# Patient Record
Sex: MALE | URBAN - METROPOLITAN AREA
[De-identification: names, ages, dates, MRNs, and addresses within clinical notes are randomized per-mention and may not be internally consistent; named-entity substitution may affect disease eponyms.]

---

## 2017-01-26 ENCOUNTER — EMERGENCY (EMERGENCY)
Facility: HOSPITAL | Age: 27
LOS: 0 days | Discharge: HOME | End: 2017-01-27
Admitting: SPECIALIST

## 2017-06-27 DIAGNOSIS — R10.13 EPIGASTRIC PAIN: ICD-10-CM

## 2017-06-27 DIAGNOSIS — K52.9 NONINFECTIVE GASTROENTERITIS AND COLITIS, UNSPECIFIED: ICD-10-CM

## 2022-06-16 ENCOUNTER — INPATIENT (INPATIENT)
Facility: HOSPITAL | Age: 32
LOS: 6 days | Discharge: HOME | End: 2022-06-23
Attending: INTERNAL MEDICINE | Admitting: INTERNAL MEDICINE
Payer: COMMERCIAL

## 2022-06-16 VITALS — SYSTOLIC BLOOD PRESSURE: 190 MMHG | DIASTOLIC BLOOD PRESSURE: 133 MMHG

## 2022-06-16 DIAGNOSIS — Z78.9 OTHER SPECIFIED HEALTH STATUS: Chronic | ICD-10-CM

## 2022-06-16 LAB
ALBUMIN SERPL ELPH-MCNC: 4 G/DL — SIGNIFICANT CHANGE UP (ref 3.5–5.2)
ALP SERPL-CCNC: 89 U/L — SIGNIFICANT CHANGE UP (ref 30–115)
ALT FLD-CCNC: 24 U/L — SIGNIFICANT CHANGE UP (ref 0–41)
ANION GAP SERPL CALC-SCNC: 14 MMOL/L — SIGNIFICANT CHANGE UP (ref 7–14)
AST SERPL-CCNC: 39 U/L — SIGNIFICANT CHANGE UP (ref 0–41)
BASE EXCESS BLDV CALC-SCNC: 8.4 MMOL/L — HIGH (ref -2–3)
BASOPHILS # BLD AUTO: 0.08 K/UL — SIGNIFICANT CHANGE UP (ref 0–0.2)
BASOPHILS NFR BLD AUTO: 0.7 % — SIGNIFICANT CHANGE UP (ref 0–1)
BILIRUB SERPL-MCNC: 1.7 MG/DL — HIGH (ref 0.2–1.2)
BUN SERPL-MCNC: 16 MG/DL — SIGNIFICANT CHANGE UP (ref 10–20)
CA-I SERPL-SCNC: 1.09 MMOL/L — LOW (ref 1.15–1.33)
CALCIUM SERPL-MCNC: 9.1 MG/DL — SIGNIFICANT CHANGE UP (ref 8.5–10.1)
CHLORIDE SERPL-SCNC: 99 MMOL/L — SIGNIFICANT CHANGE UP (ref 98–110)
CO2 SERPL-SCNC: 27 MMOL/L — SIGNIFICANT CHANGE UP (ref 17–32)
CREAT SERPL-MCNC: 1.1 MG/DL — SIGNIFICANT CHANGE UP (ref 0.7–1.5)
CRP SERPL-MCNC: 10.8 MG/L — HIGH
D DIMER BLD IA.RAPID-MCNC: 470 NG/ML DDU — HIGH (ref 0–230)
EGFR: 90 ML/MIN/1.73M2 — SIGNIFICANT CHANGE UP
EOSINOPHIL # BLD AUTO: 0.11 K/UL — SIGNIFICANT CHANGE UP (ref 0–0.7)
EOSINOPHIL NFR BLD AUTO: 1 % — SIGNIFICANT CHANGE UP (ref 0–8)
ERYTHROCYTE [SEDIMENTATION RATE] IN BLOOD: 9 MM/HR — SIGNIFICANT CHANGE UP (ref 0–10)
GAS PNL BLDV: 137 MMOL/L — SIGNIFICANT CHANGE UP (ref 136–145)
GAS PNL BLDV: SIGNIFICANT CHANGE UP
GAS PNL BLDV: SIGNIFICANT CHANGE UP
GLUCOSE SERPL-MCNC: 102 MG/DL — HIGH (ref 70–99)
HCO3 BLDV-SCNC: 34 MMOL/L — HIGH (ref 22–29)
HCT VFR BLD CALC: 48.8 % — SIGNIFICANT CHANGE UP (ref 42–52)
HCT VFR BLDA CALC: 48 % — SIGNIFICANT CHANGE UP (ref 39–51)
HGB BLD CALC-MCNC: 15.9 G/DL — SIGNIFICANT CHANGE UP (ref 12.6–17.4)
HGB BLD-MCNC: 15.6 G/DL — SIGNIFICANT CHANGE UP (ref 14–18)
IMM GRANULOCYTES NFR BLD AUTO: 0.4 % — HIGH (ref 0.1–0.3)
LACTATE BLDV-MCNC: 1.3 MMOL/L — SIGNIFICANT CHANGE UP (ref 0.5–2)
LYMPHOCYTES # BLD AUTO: 2.97 K/UL — SIGNIFICANT CHANGE UP (ref 1.2–3.4)
LYMPHOCYTES # BLD AUTO: 26.3 % — SIGNIFICANT CHANGE UP (ref 20.5–51.1)
MAGNESIUM SERPL-MCNC: 1.5 MG/DL — LOW (ref 1.8–2.4)
MCHC RBC-ENTMCNC: 26.2 PG — LOW (ref 27–31)
MCHC RBC-ENTMCNC: 32 G/DL — SIGNIFICANT CHANGE UP (ref 32–37)
MCV RBC AUTO: 82 FL — SIGNIFICANT CHANGE UP (ref 80–94)
MONOCYTES # BLD AUTO: 0.53 K/UL — SIGNIFICANT CHANGE UP (ref 0.1–0.6)
MONOCYTES NFR BLD AUTO: 4.7 % — SIGNIFICANT CHANGE UP (ref 1.7–9.3)
NEUTROPHILS # BLD AUTO: 7.56 K/UL — HIGH (ref 1.4–6.5)
NEUTROPHILS NFR BLD AUTO: 66.9 % — SIGNIFICANT CHANGE UP (ref 42.2–75.2)
NRBC # BLD: 0 /100 WBCS — SIGNIFICANT CHANGE UP (ref 0–0)
NT-PROBNP SERPL-SCNC: 1827 PG/ML — HIGH (ref 0–300)
PCO2 BLDV: 46 MMHG — SIGNIFICANT CHANGE UP (ref 42–55)
PH BLDV: 7.47 — HIGH (ref 7.32–7.43)
PLATELET # BLD AUTO: 300 K/UL — SIGNIFICANT CHANGE UP (ref 130–400)
PO2 BLDV: 39 MMHG — SIGNIFICANT CHANGE UP
POTASSIUM BLDV-SCNC: 2.9 MMOL/L — CRITICAL LOW (ref 3.5–5.1)
POTASSIUM SERPL-MCNC: 3.3 MMOL/L — LOW (ref 3.5–5)
POTASSIUM SERPL-SCNC: 3.3 MMOL/L — LOW (ref 3.5–5)
PROT SERPL-MCNC: 6.6 G/DL — SIGNIFICANT CHANGE UP (ref 6–8)
RAPID RVP RESULT: SIGNIFICANT CHANGE UP
RBC # BLD: 5.95 M/UL — SIGNIFICANT CHANGE UP (ref 4.7–6.1)
RBC # FLD: 18.7 % — HIGH (ref 11.5–14.5)
SAO2 % BLDV: 63.6 % — SIGNIFICANT CHANGE UP
SARS-COV-2 RNA SPEC QL NAA+PROBE: SIGNIFICANT CHANGE UP
SODIUM SERPL-SCNC: 140 MMOL/L — SIGNIFICANT CHANGE UP (ref 135–146)
TROPONIN T SERPL-MCNC: 0.08 NG/ML — CRITICAL HIGH
TROPONIN T SERPL-MCNC: 0.09 NG/ML — CRITICAL HIGH
TSH SERPL-MCNC: 6.55 UIU/ML — HIGH (ref 0.27–4.2)
WBC # BLD: 11.29 K/UL — HIGH (ref 4.8–10.8)
WBC # FLD AUTO: 11.29 K/UL — HIGH (ref 4.8–10.8)

## 2022-06-16 PROCEDURE — 74177 CT ABD & PELVIS W/CONTRAST: CPT | Mod: 26,MA

## 2022-06-16 PROCEDURE — 71045 X-RAY EXAM CHEST 1 VIEW: CPT | Mod: 26

## 2022-06-16 PROCEDURE — 99223 1ST HOSP IP/OBS HIGH 75: CPT

## 2022-06-16 PROCEDURE — 71275 CT ANGIOGRAPHY CHEST: CPT | Mod: 26,MA

## 2022-06-16 PROCEDURE — 93306 TTE W/DOPPLER COMPLETE: CPT | Mod: 26

## 2022-06-16 PROCEDURE — 93010 ELECTROCARDIOGRAM REPORT: CPT

## 2022-06-16 PROCEDURE — 99285 EMERGENCY DEPT VISIT HI MDM: CPT | Mod: 25

## 2022-06-16 PROCEDURE — 93308 TTE F-UP OR LMTD: CPT | Mod: 26

## 2022-06-16 RX ORDER — MAGNESIUM SULFATE 500 MG/ML
2 VIAL (ML) INJECTION ONCE
Refills: 0 | Status: COMPLETED | OUTPATIENT
Start: 2022-06-16 | End: 2022-06-16

## 2022-06-16 RX ORDER — LISINOPRIL 2.5 MG/1
10 TABLET ORAL DAILY
Refills: 0 | Status: DISCONTINUED | OUTPATIENT
Start: 2022-06-16 | End: 2022-06-17

## 2022-06-16 RX ORDER — LABETALOL HCL 100 MG
10 TABLET ORAL ONCE
Refills: 0 | Status: COMPLETED | OUTPATIENT
Start: 2022-06-16 | End: 2022-06-16

## 2022-06-16 RX ORDER — CHLORHEXIDINE GLUCONATE 213 G/1000ML
1 SOLUTION TOPICAL
Refills: 0 | Status: DISCONTINUED | OUTPATIENT
Start: 2022-06-16 | End: 2022-06-23

## 2022-06-16 RX ORDER — POTASSIUM CHLORIDE 20 MEQ
20 PACKET (EA) ORAL ONCE
Refills: 0 | Status: COMPLETED | OUTPATIENT
Start: 2022-06-16 | End: 2022-06-16

## 2022-06-16 RX ORDER — ENOXAPARIN SODIUM 100 MG/ML
40 INJECTION SUBCUTANEOUS EVERY 24 HOURS
Refills: 0 | Status: DISCONTINUED | OUTPATIENT
Start: 2022-06-16 | End: 2022-06-23

## 2022-06-16 RX ORDER — CARVEDILOL PHOSPHATE 80 MG/1
3.12 CAPSULE, EXTENDED RELEASE ORAL EVERY 12 HOURS
Refills: 0 | Status: DISCONTINUED | OUTPATIENT
Start: 2022-06-16 | End: 2022-06-20

## 2022-06-16 RX ORDER — FUROSEMIDE 40 MG
40 TABLET ORAL DAILY
Refills: 0 | Status: DISCONTINUED | OUTPATIENT
Start: 2022-06-16 | End: 2022-06-17

## 2022-06-16 RX ORDER — ONDANSETRON 8 MG/1
4 TABLET, FILM COATED ORAL ONCE
Refills: 0 | Status: COMPLETED | OUTPATIENT
Start: 2022-06-16 | End: 2022-06-16

## 2022-06-16 RX ORDER — POTASSIUM CHLORIDE 20 MEQ
40 PACKET (EA) ORAL ONCE
Refills: 0 | Status: COMPLETED | OUTPATIENT
Start: 2022-06-16 | End: 2022-06-16

## 2022-06-16 RX ORDER — FUROSEMIDE 40 MG
40 TABLET ORAL ONCE
Refills: 0 | Status: COMPLETED | OUTPATIENT
Start: 2022-06-16 | End: 2022-06-16

## 2022-06-16 RX ADMIN — LISINOPRIL 10 MILLIGRAM(S): 2.5 TABLET ORAL at 16:59

## 2022-06-16 RX ADMIN — CARVEDILOL PHOSPHATE 3.12 MILLIGRAM(S): 80 CAPSULE, EXTENDED RELEASE ORAL at 20:47

## 2022-06-16 RX ADMIN — Medication 40 MILLIGRAM(S): at 11:23

## 2022-06-16 RX ADMIN — Medication 50 MILLIEQUIVALENT(S): at 10:33

## 2022-06-16 RX ADMIN — Medication 2 GRAM(S): at 09:38

## 2022-06-16 RX ADMIN — Medication 25 GRAM(S): at 09:05

## 2022-06-16 RX ADMIN — Medication 20 MILLIEQUIVALENT(S): at 12:09

## 2022-06-16 RX ADMIN — Medication 1.25 MILLIGRAM(S): at 11:23

## 2022-06-16 RX ADMIN — Medication 10 MILLIGRAM(S): at 18:38

## 2022-06-16 RX ADMIN — ENOXAPARIN SODIUM 40 MILLIGRAM(S): 100 INJECTION SUBCUTANEOUS at 20:47

## 2022-06-16 RX ADMIN — ONDANSETRON 4 MILLIGRAM(S): 8 TABLET, FILM COATED ORAL at 08:13

## 2022-06-16 NOTE — ED ADULT TRIAGE NOTE - PAIN: PRESENCE, MLM
Reclast injection #3 scheduled for 01/04/21 at 9:00.  Lab scheduled for patient to schedule at .  Dx M81.0.  Please create orders.  3   complains of pain/discomfort

## 2022-06-16 NOTE — CONSULT NOTE ADULT - SUBJECTIVE AND OBJECTIVE BOX
Cardiologist: none    HPI:  32 year old man with no PMHx presenting with palpitations since 4AM. He has been having progressive SOB and GUTIERREZ over the past 2 weeks and epigastric and chest "fullness" with minimal eating. He also endorses a few episodes of NBNB vomiting and b/l SHAHEEN. He has been sleeping upright the past few days. Denied cp, HA, fever, chills, diarrhea, rash.    PAST MEDICAL & SURGICAL HISTORY      FAMILY HISTORY:  FAMILY HISTORY: strong history of heart disease    SOCIAL HISTORY:  []smoker  []Alcohol  []Drug    ALLERGIES:  No Known Allergies    MEDICATIONS:  MEDICATIONS  (STANDING):  potassium chloride   Powder 40 milliEquivalent(s) Oral Once  potassium chloride  20 mEq/100 mL IVPB 20 milliEquivalent(s) IV Intermittent once    MEDICATIONS  (PRN):    HOME MEDICATIONS:  Home Medications:    VITALS:   T(F): 98.4 (06-16 @ 06:31), Max: 98.4 (06-16 @ 06:31)  HR: 110 (06-16 @ 09:05) (110 - 115)  BP: 178/118 (06-16 @ 09:05) (178/118 - 198/141)  BP(mean): --  RR: 18 (06-16 @ 06:31) (18 - 18)  SpO2: 96% (06-16 @ 06:31) (96% - 96%)    REVIEW OF SYSTEMS:    Negative except as mentioned in HPI      PHYSICAL EXAM:  NEURO: patient is awake , alert and oriented  GEN: Not in acute distress  NECK: no thyroid enlargement  LUNGS: course bs  CARDIOVASCULAR: S1/S2 present, tachycardic  ABD: Soft, non-tender, non-distended, +BS  EXT: b/l SHAHEEN  SKIN: Intact    LABS:                        15.6   11.29 )-----------( 300      ( 16 Jun 2022 08:00 )             48.8     06-16    140  |  99  |  16  ----------------------------<  102<H>  3.3<L>   |  27  |  1.1    Ca    9.1      16 Jun 2022 08:00  Mg     1.5     06-16    TPro  6.6  /  Alb  4.0  /  TBili  1.7<H>  /  DBili  x   /  AST  39  /  ALT  24  /  AlkPhos  89  06-16      Troponin T, Serum: 0.09 ng/mL *HH* (06-16-22 @ 08:00)    CARDIAC MARKERS ( 16 Jun 2022 08:00 )  x     / 0.09 ng/mL / x     / x     / x        Serum Pro-Brain Natriuretic Peptide: 1827 pg/mL (06-16-22 @ 08:00)    RADIOLOGY:  -CXR:  < from: Xray Chest 1 View- PORTABLE-Urgent (06.16.22 @ 08:30) >  Impression:    Cardiomegaly with interstitial prominence which may be related to edema.      < end of copied text >    -TTE:    -CCTA:    -STRESS TEST:    -CATHETERIZATION:    ECG:  < from: 12 Lead ECG (06.16.22 @ 06:26) >  Ventricular Rate 114 BPM    Atrial Rate 114 BPM    P-R Interval 138 ms    QRS Duration 98 ms    Q-T Interval 352 ms    QTC Calculation(Bazett) 485 ms    P Axis 55 degrees    R Axis -65 degrees    T Axis 88 degrees    Diagnosis Line Sinus tachycardia  Left atrial enlargement  Left anterior fascicular block  Nonspecific T wave abnormality  Abnormal ECG    < end of copied text >    TELEMETRY EVENTS: sinus tachycardia

## 2022-06-16 NOTE — ED PROVIDER NOTE - PROGRESS NOTE DETAILS
DC: Cardiology consulted regarding troponin. will evaluate. DC: Cardiology consult appreciated.  Lasix given.  Enalapril IV given for the blood pressure.  Imaging reviewed no PE.  Patient aware that he will be admitted.  RVP sent as well as CRP and ESR.  Consulted cardio telemetry however there are no beds available recommend admission to med telemetry

## 2022-06-16 NOTE — ED PROVIDER NOTE - PHYSICAL EXAMINATION
CONSTITUTIONAL: Well-developed; obese; in no acute distress.   SKIN: warm, dry.  HEAD: Normocephalic; atraumatic.  EYES: PERRL, EOMI, no conjunctival erythema- +right conjunctival hemorrhage  ENT: No nasal discharge; airway clear.  NECK: Supple; non tender.  CARD: S1, S2 normal; no murmurs, gallops, or rubs. Regular rate and rhythm.   RESP: No wheezes, rales or rhonchi.  ABD: soft ntnd.   EXT: Normal ROM.  LE edema bilaterally.   NEURO: Alert, oriented, grossly unremarkable.  PSYCH: Cooperative, appropriate.

## 2022-06-16 NOTE — H&P ADULT - HISTORY OF PRESENT ILLNESS
32M w/ no PMHx presenting with palpitations since 4AM. He has been having progressive SOB and GUTIERREZ over the past 2 weeks and epigastric and chest "fullness" with minimal eating. He also endorses a few episodes of NBNB vomiting and b/l SHAHEEN. He has been sleeping upright the past few days. Denied cp, HA, fever, chills, diarrhea, rash. 32M w/ no PMHx presents after he woke up from sleep w/ palpitations and SOB. Pt reports about a week ago he vomitted and since then he has been having episodes of dry cough, myalgia, and "Constricted" like feeling around chest. He also noticed that past few nights he has been sleeping with head elevation because it felt comfortable. No prior episodes of the same. Currently only c/o fatigue. Denies fever, chills, CP, SOB, palpitations, cough, wheezing, abd pain, nausea, vomiting, diarrhea, constipation, dysuria, or abnormal bleeding.  No Hx of COVID-19 infection. Received total 3 moderna shots.

## 2022-06-16 NOTE — H&P ADULT - NSHPLABSRESULTS_GEN_ALL_CORE
15.6   11.29 )-----------( 300      ( 16 Jun 2022 08:00 )             48.8       06-16    140  |  99  |  16  ----------------------------<  102<H>  3.3<L>   |  27  |  1.1    Ca    9.1      16 Jun 2022 08:00  Mg     1.5     06-16    TPro  6.6  /  Alb  4.0  /  TBili  1.7<H>  /  DBili  x   /  AST  39  /  ALT  24  /  AlkPhos  89  06-16        CARDIAC MARKERS ( 16 Jun 2022 08:00 )  x     / 0.09 ng/mL / x     / x     / x      < from: Xray Chest 1 View- PORTABLE-Urgent (06.16.22 @ 08:30) >  Impression:  Cardiomegaly with interstitial prominence which may be related to edema.  --- End of Report ---  < end of copied text >    < from: CT Angio Chest PE Protocol w/ IV Cont (06.16.22 @ 10:01) >  IMPRESSION:  No pulmonary embolus.  Small right, trace left pleural effusions with adjacent compressive   atelectasis.  Cardiomegaly.  No acute abnormality in the abdomen or pelvis.  --- End of Report ---  < end of copied text >      < from: CT Abdomen and Pelvis w/ IV Cont (06.16.22 @ 10:01) >  IMPRESSION:  No pulmonary embolus.  Small right, trace left pleural effusions with adjacent compressive   atelectasis. Cardiomegaly. No acute abnormality in the abdomen or pelvis.  --- End of Report ---  < end of copied text >

## 2022-06-16 NOTE — ED PROVIDER NOTE - NS ED ROS FT
Constitutional: No fevers.   Eyes:  +subconjunctival hemorrhage.   ENMT:  No sore throat or runny nose.  Cardiac:  +sob  Respiratory:  + cough  GI:  +vomiting.   :  No dysuria, frequency or burning.  MS:  No myalgia, muscle weakness, joint pain or back pain.  Neuro:  No headache or weakness.  No LOC.  Skin:  No skin rash.   Endocrine: No history of thyroid disease or diabetes.

## 2022-06-16 NOTE — ED ADULT TRIAGE NOTE - CHIEF COMPLAINT QUOTE
I've had nausea and bloating, today my heart was beating very fast and I was breathing heavy. I've vomited twice in the last week. And I also have a popped blood vessel in my right eye - patient

## 2022-06-16 NOTE — ED PROVIDER NOTE - CLINICAL SUMMARY MEDICAL DECISION MAKING FREE TEXT BOX
31 yo M w/ exertional dyspnea 2/2 enoch-myocarditis.   fluid overloaded.  hypertensive  cardiology consult appreciated.  admitted for further management.

## 2022-06-16 NOTE — CONSULT NOTE ADULT - ATTENDING COMMENTS
32M with obesity presenting with worsening dyspnea on exertion, orthopnea, LE edema and abdominal bloating over the past few weeks. He presented to the ED on 6/16/22 after palpitations lasting for 10-15 minutes woke him up from sleep. No associated pre-syncope/syncope or chest pain. Volume up on exam, but speaking in complete sentences. Hypertensive with sinus tachycardia 100s.     WBC 11. Trop 0.09. Pro BNP 1827. K 3.3. Mg 1.5. TSH 6.55. CRP 10.8. ESR 9. Normal renal function. LA 1.3. RVP negative.   CTA chest negative for PE but with R>L pleural effusions.     TTE 6/16/22 (formal interpretation pending) with reduced LVEF of about 20%, small pericardial effusion, apical hypertrophy     Symptoms are due to acute systolic heart failure. Etiology unknown at this time. It is reassuring that renal function, LFTs, and lactic acid are within normal limits. Recommend close monitoring. LHC to assess for ischemic heart disease (less likely) and right heart cath to assess pressures and calculate CO/CI.     Trend troponin  Monitor for arrhythmias on telemetry  LHC/RHC on 6/17/22 with Dr. Viola Amato IV  No beta blocker at this time  Heart failure consult  Upgrade to CCU stepdown   Discussed with Efrain Valerio, and Viola Smith MD  Vascular Cardiology Attending  Please text or call via MS Teams with questions

## 2022-06-16 NOTE — CONSULT NOTE ADULT - ASSESSMENT
Progressive GUTIERREZ, possible enoch-myocarditis  Hypertensive urgency  Strong family history of CAD    -tele monitoring  -TTE  -replete K and Mg  -keep K>4 and Mg>2  -RVP  -lasix 40mg IV  -trend troponin  -ID eval  -ESR, CRP Progressive GUTIERREZ, possible enoch-myocarditis  Hypertensive urgency  Strong family history of CAD    -tele monitoring  -TTE  -replete K and Mg  -keep K>4 and Mg>2  -RVP  -lasix 40mg IV  -trend troponin  -ESR nL, CRP elevated  -lipid panel Undefined Cardiomyopathy, enoch-myocarditis?  Hypertensive urgency  Strong family history of CAD    -tele monitoring  -TTE showing EF 20%, small to moderate pericardial effusion, IVC dilated  -replete K and Mg  -keep K>4 and Mg>2  -RVP (-)  -lasix 40mg IV  -trend troponin  -ESR nL, CRP elevated  -lipid panel  -will plan for LHC/RHC 6/17/22  -NPO after MN  -active COVID swab within 72 hours of 6/17  -HF eval Undefined Cardiomyopathy, enoch-myocarditis?  Hypertensive urgency  Hypothyroidism  Strong family history of CAD    -tele monitoring  -TTE showing EF 20%, small to moderate pericardial effusion, IVC dilated  -replete K and Mg  -keep K>4 and Mg>2  -RVP (-)  -lasix 40mg IV  -trend troponin  -ESR nL, CRP elevated  -lipid panel  -will plan for LHC/RHC 6/17/22  -NPO after MN  -active COVID swab within 72 hours of 6/17  -HF eval  -send T3, fT4 Undefined Cardiomyopathy, enoch-myocarditis?  Hypertensive urgency  Hypothyroidism  Strong family history of CAD    -upgrade to Cardiology SDU  -TTE showing EF 20%, small to moderate pericardial effusion, IVC dilated  -replete K and Mg  -keep K>4 and Mg>2  -RVP (-)  -lasix 40mg IV  -trend troponin  -ESR nL, CRP elevated  -lipid panel  -will plan for LHC/RHC 6/17/22  -NPO after MN  -active COVID swab within 72 hours of 6/17  -HF eval  -send T3, fT4 Undefined Cardiomyopathy, enoch-myocarditis?  Hypertensive urgency  Hypothyroidism  Strong family history of CAD    -upgrade to Cardiology SDU  -TTE showing EF 20%, small to moderate pericardial effusion, IVC dilated  -replete K and Mg  -keep K>4 and Mg>2  -RVP (-)  -lasix 40mg IV  -trend troponin  -ESR nL, CRP elevated  -check lipid panel, Hgb A1c, T3, fT4  -will plan for LHC/RHC 6/17/22  -NPO after MN  -active COVID swab within 72 hours of 6/17  -HF eval

## 2022-06-16 NOTE — ED PROVIDER NOTE - NS ED MD DISPO SPECIAL CONSIDERATION1
Dc instructions verbalized by pt, all questions answered at this time. No distress upon dc. Scripts given.       None

## 2022-06-16 NOTE — ED PROVIDER NOTE - OBJECTIVE STATEMENT
32-year-old male with no diagnosed past medical history presenting with multiple medical complaints.  Patient states that over the last 2 weeks he had progressive shortness of breath with exertion denies any overt chest pain however endorses bloating in the epigastric region as well as the chest especially with minimal eating.  There is a few episodes of nonbloody nonbilious vomiting over the course of the last 5 to 6 days.  Denies any fevers or chills.  Denies any history of IV drug use.  Endorses lower extremity edema bilaterally over the last couple of days.  Denies any herbal supplements and or alcohol intake.  Strong family history of cardiac disease. also endorses dry cough x 5 days, endorses subconjunctival hemorrhage to right eye. endorses sleeping upright x few days. States he woke up with palpitations this AM around 4 am, since then have not resolved. denies syncope.

## 2022-06-16 NOTE — ED ADULT NURSE NOTE - OBJECTIVE STATEMENT
pt c/o dyspnea on exertion associated with rapid heart rate, abdominal distension and nausea x1 week. pt reports BL feet swelling. denies chest pain.

## 2022-06-16 NOTE — PATIENT PROFILE ADULT - FALL HARM RISK - HARM RISK INTERVENTIONS

## 2022-06-16 NOTE — H&P ADULT - NSHPPHYSICALEXAM_GEN_ALL_CORE
GENERAL: NAD, Resting in bed  HEENT: NCAT  CHEST/LUNG: Clear to auscultation bilaterally; No wheezing or rubs.   HEART: Regular rate and rhythm; No murmurs, rubs, or gallops  ABDOMEN: Bowel sounds present; Soft, Nontender, Nondistended.   EXTREMITIES:  No clubbing, cyanosis, or edema  NERVOUS SYSTEM:  Alert & Oriented X3 GENERAL: NAD, Resting in bed, obese   HEENT: NCAT  CHEST/LUNG: Clear to auscultation bilaterally; No wheezing or rubs.   HEART: Regular rate and rhythm; No murmurs, rubs, or gallops  ABDOMEN: Bowel sounds present; Soft, Nontender, Nondistended.   EXTREMITIES:  No clubbing, cyanosis, or edema  NERVOUS SYSTEM:  Alert & Oriented X3 GENERAL: NAD, Resting in bed, obese   HEENT: NCAT  CHEST/LUNG: Clear to auscultation bilaterally;  neck no jvp  HEART: Regular rate and rhythm; No murmurs, rubs, or gallops  ABDOMEN:  Soft, Nontender, Nondistended.   EXTREMITIES:  +1 LE edema b/l  skin nochanges  NERVOUS SYSTEM:  Alert & Oriented X3

## 2022-06-16 NOTE — H&P ADULT - ASSESSMENT
32M w/ no PMHx presents after he woke up from sleep w/ palpitations and SOB. Pt reports about a week ago he vomitted and since then he has been having episodes of dry cough, myalgia, and "Constricted" like feeling around chest.    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  - CTA chest: no PE, cardiomegaly, b/l pleural effusions  - CTAP: No acute abnormality in the abdomen or pelvis.  - EKG: No PRABHAKAR or STD, Trop 0.09x1, BNP 1827, TSH 6.55  - CRP elevated, ESR 9  - Seen by Cardio: Trend trop, Echo, Lasix 40 IV, Keep K>4 and Mg >2, ID eval, check RVP  - Start lisinopril 10mg qd - titrate up as needed, bb at discharge, Sleep study for EDDIE o/p    # Elevated TSH  - check free T4  - repeat thyroid levels in 4-6 weeks    # obesity  - encourage weight loss, healthy diet, and exercise  - RD eval    # DVT ppx - Lovenox   # GI ppx - PPI   # Diet - DASH  Full code.      32M w/ no PMHx presents after he woke up from sleep w/ palpitations and SOB. Pt reports about a week ago he vomitted and since then he has been having episodes of dry cough, myalgia, and "Constricted" like feeling around chest.    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  - CTA chest: no PE, cardiomegaly, b/l pleural effusions  - CTAP: No acute abnormality in the abdomen or pelvis.  - EKG: No PRABHAKAR or STD, Trop 0.09x1, BNP 1827, TSH 6.55  - CRP elevated, ESR 9  - Seen by Cardio: Trend trop, Echo, Lasix 40 IV, Keep K>4 and Mg >2, ID eval, check RVP  - Start lisinopril 10mg qd - titrate up as needed, bb at discharge, Sleep study for EDDIE o/p    # Elevated TSH  - check free T4  - repeat thyroid levels in 4-6 weeks    # Hypokalemia, hypomagnesemia  - repleted, monitor lytes     # obesity  - encourage weight loss, healthy diet, and exercise  - RD eval    # DVT ppx - Lovenox   # GI ppx - PPI   # Diet - DASH  Full code.      32M w/ no PMHx presents after he woke up from sleep w/ palpitations and SOB. Pt reports about a week ago he vomitted and since then he has been having episodes of dry cough, myalgia, and "Constricted" like feeling around chest.    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  - CTA chest: no PE, cardiomegaly, b/l pleural effusions  - CTAP: No acute abnormality in the abdomen or pelvis.  - EKG: No PRABHAKAR or STD, Trop 0.09x1, BNP 1827, TSH 6.55, check lipids   - CRP elevated, ESR 9  - Seen by Cardio: Trend trop, Echo, Lasix 40 IV, Keep K>4 and Mg >2, ID eval, check RVP  - Start lisinopril 10mg qd - titrate up as needed, bb at discharge, Sleep study for EDDIE o/p  - strict I' and O's, daily weight, 1L fluid restriction    # Elevated TSH  - check free T4  - repeat thyroid levels in 4-6 weeks    # Hypokalemia, hypomagnesemia  - repleted, monitor lytes     # obesity  - encourage weight loss, healthy diet, and exercise  - RD eval    # DVT ppx - Lovenox   # GI ppx - PPI   # Diet - DASH/ 1L fluid restriction   Full code.      32M w/ no PMHx presents after he woke up from sleep w/ palpitations and SOB. Pt reports about a week ago he vomitted and since then he has been having episodes of dry cough, myalgia, and "Constricted" like feeling around chest.    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  - CTA chest: no PE, cardiomegaly, b/l pleural effusions  - CTAP: No acute abnormality in the abdomen or pelvis.  - EKG: No PRABHAKAR or STD, Trop 0.09x1, BNP 1827, TSH 6.55, check lipids   - CRP elevated, ESR 9, RVP negative   - Seen by Cardio: Trend trop, Echo, Lasix 40 IV, Keep K>4 and Mg >2, ID eval,   - Start lisinopril 10mg qd - titrate up as needed, bb at discharge, Sleep study for EDDIE o/p  - strict I' and O's, daily weight, 1L fluid restriction    # Elevated TSH  - check free T4  - repeat thyroid levels in 4-6 weeks    # Hypokalemia, hypomagnesemia  - repleted, monitor lytes     # obesity  - encourage weight loss, healthy diet, and exercise  - RD eval    # DVT ppx - Lovenox   # GI ppx - PPI   # Diet - DASH/ 1L fluid restriction   Full code.      32M w/ no PMHx presents after he woke up from sleep w/ palpitations and SOB. Pt reports about a week ago he vomitted and since then he has been having episodes of dry cough, myalgia, and "Constricted" like feeling around chest.    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  - CTA chest: no PE, cardiomegaly, b/l pleural effusions  - CTAP: No acute abnormality in the abdomen or pelvis.  - EKG: No PRABHAKAR or STD, Trop 0.09x1, BNP 1827, TSH 6.55, check lipids   - CRP elevated, ESR 9, RVP negative   - Seen by Cardio: Trend trop, Echo, Lasix 40 IV, Keep K>4 and Mg >2, ID eval,   - Start lisinopril 10mg qd - titrate up as needed, bb at discharge, Sleep study for EDDIE o/p  - strict I' and O's, daily weight, 1L fluid restriction    # Elevated TSH  - check free T4  - repeat thyroid levels in 4-6 weeks    # Hypokalemia, hypomagnesemia  - repleted, monitor lytes     # obesity  - encourage weight loss, healthy diet, and exercise  - RD eval    # DVT ppx - Lovenox   # GI ppx - not needed  # Diet - DASH/ 1L fluid restriction   Full code.      32M w/ no PMHx presents after he woke up from sleep w/ palpitations and SOB. Pt reports about a week ago he vomitted and since then he has been having episodes of dry cough, myalgia, and "Constricted" like feeling around chest.    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  - CTA chest: no PE, cardiomegaly, b/l pleural effusions  - CTAP: No acute abnormality in the abdomen or pelvis.  - EKG: No PRABHAKAR or STD, Trop 0.09x1, BNP 1827, TSH 6.55, check lipids   - CRP elevated, ESR 9, RVP negative   - Seen by Cardio: Trend trop, Echo, Lasix 40 IV, Keep K>4 and Mg >2, ID eval,   - Start lisinopril 10mg qd - titrate up as needed, bb at discharge, Sleep study for EDDIE o/p  - strict I' and O's, daily weight, 1L fluid restriction    # Elevated TSH  - check free T4  - repeat thyroid levels in 4-6 weeks    # Hypokalemia, hypomagnesemia  - repleted, monitor lytes     # obesity  - encourage weight loss, healthy diet, and exercise  - RD eval    # DVT ppx - Lovenox   # GI ppx - not needed  # Diet - DASH/ 1L fluid restriction   Full code.

## 2022-06-16 NOTE — H&P ADULT - ATTENDING COMMENTS
The patient is seen and examined the history labs and imaging are reviewed. I agree with the resident note unless otherwise noted. 32M w/ no PMHx presents after he woke up from sleep w/ palpitations and SOB. reports chest tightness recently and dyspnea on exertion. mild LE edema in ED; new onset systolic heart failure,  TTE showing EF 20%, small to moderate pericardial effusion, IVC dilated started IV lasix 40mg IV, elevated CRP, trop 0.09, EKG sinus tachycardia and nonspecific st changes. seen by cardio, cardiac cath tomorrow, upgrade to cardiac step down.

## 2022-06-17 LAB
ALBUMIN SERPL ELPH-MCNC: 3.7 G/DL — SIGNIFICANT CHANGE UP (ref 3.5–5.2)
ALP SERPL-CCNC: 84 U/L — SIGNIFICANT CHANGE UP (ref 30–115)
ALT FLD-CCNC: 24 U/L — SIGNIFICANT CHANGE UP (ref 0–41)
ANION GAP SERPL CALC-SCNC: 15 MMOL/L — HIGH (ref 7–14)
ANION GAP SERPL CALC-SCNC: 17 MMOL/L — HIGH (ref 7–14)
AST SERPL-CCNC: 30 U/L — SIGNIFICANT CHANGE UP (ref 0–41)
BASOPHILS # BLD AUTO: 0.08 K/UL — SIGNIFICANT CHANGE UP (ref 0–0.2)
BASOPHILS NFR BLD AUTO: 0.8 % — SIGNIFICANT CHANGE UP (ref 0–1)
BILIRUB SERPL-MCNC: 1.9 MG/DL — HIGH (ref 0.2–1.2)
BUN SERPL-MCNC: 16 MG/DL — SIGNIFICANT CHANGE UP (ref 10–20)
BUN SERPL-MCNC: 17 MG/DL — SIGNIFICANT CHANGE UP (ref 10–20)
CALCIUM SERPL-MCNC: 8.3 MG/DL — LOW (ref 8.5–10.1)
CALCIUM SERPL-MCNC: 8.4 MG/DL — LOW (ref 8.5–10.1)
CHLORIDE SERPL-SCNC: 100 MMOL/L — SIGNIFICANT CHANGE UP (ref 98–110)
CHLORIDE SERPL-SCNC: 106 MMOL/L — SIGNIFICANT CHANGE UP (ref 98–110)
CHOLEST SERPL-MCNC: 124 MG/DL — SIGNIFICANT CHANGE UP
CO2 SERPL-SCNC: 25 MMOL/L — SIGNIFICANT CHANGE UP (ref 17–32)
CO2 SERPL-SCNC: 26 MMOL/L — SIGNIFICANT CHANGE UP (ref 17–32)
CREAT SERPL-MCNC: 1.1 MG/DL — SIGNIFICANT CHANGE UP (ref 0.7–1.5)
CREAT SERPL-MCNC: 1.1 MG/DL — SIGNIFICANT CHANGE UP (ref 0.7–1.5)
EGFR: 90 ML/MIN/1.73M2 — SIGNIFICANT CHANGE UP
EGFR: 90 ML/MIN/1.73M2 — SIGNIFICANT CHANGE UP
EOSINOPHIL # BLD AUTO: 0.1 K/UL — SIGNIFICANT CHANGE UP (ref 0–0.7)
EOSINOPHIL NFR BLD AUTO: 1 % — SIGNIFICANT CHANGE UP (ref 0–8)
GLUCOSE SERPL-MCNC: 79 MG/DL — SIGNIFICANT CHANGE UP (ref 70–99)
GLUCOSE SERPL-MCNC: 97 MG/DL — SIGNIFICANT CHANGE UP (ref 70–99)
HCT VFR BLD CALC: 48 % — SIGNIFICANT CHANGE UP (ref 42–52)
HDLC SERPL-MCNC: 17 MG/DL — LOW
HGB BLD-MCNC: 15.2 G/DL — SIGNIFICANT CHANGE UP (ref 14–18)
IMM GRANULOCYTES NFR BLD AUTO: 0.4 % — HIGH (ref 0.1–0.3)
LIPID PNL WITH DIRECT LDL SERPL: 89 MG/DL — SIGNIFICANT CHANGE UP
LYMPHOCYTES # BLD AUTO: 2.67 K/UL — SIGNIFICANT CHANGE UP (ref 1.2–3.4)
LYMPHOCYTES # BLD AUTO: 26.5 % — SIGNIFICANT CHANGE UP (ref 20.5–51.1)
MAGNESIUM SERPL-MCNC: 1.5 MG/DL — LOW (ref 1.8–2.4)
MCHC RBC-ENTMCNC: 26.3 PG — LOW (ref 27–31)
MCHC RBC-ENTMCNC: 31.7 G/DL — LOW (ref 32–37)
MCV RBC AUTO: 83 FL — SIGNIFICANT CHANGE UP (ref 80–94)
MONOCYTES # BLD AUTO: 0.6 K/UL — SIGNIFICANT CHANGE UP (ref 0.1–0.6)
MONOCYTES NFR BLD AUTO: 5.9 % — SIGNIFICANT CHANGE UP (ref 1.7–9.3)
NEUTROPHILS # BLD AUTO: 6.6 K/UL — HIGH (ref 1.4–6.5)
NEUTROPHILS NFR BLD AUTO: 65.4 % — SIGNIFICANT CHANGE UP (ref 42.2–75.2)
NON HDL CHOLESTEROL: 107 MG/DL — SIGNIFICANT CHANGE UP
NRBC # BLD: 0 /100 WBCS — SIGNIFICANT CHANGE UP (ref 0–0)
PLATELET # BLD AUTO: 280 K/UL — SIGNIFICANT CHANGE UP (ref 130–400)
POTASSIUM SERPL-MCNC: 3 MMOL/L — LOW (ref 3.5–5)
POTASSIUM SERPL-MCNC: 3.7 MMOL/L — SIGNIFICANT CHANGE UP (ref 3.5–5)
POTASSIUM SERPL-SCNC: 3 MMOL/L — LOW (ref 3.5–5)
POTASSIUM SERPL-SCNC: 3.7 MMOL/L — SIGNIFICANT CHANGE UP (ref 3.5–5)
PROT SERPL-MCNC: 6 G/DL — SIGNIFICANT CHANGE UP (ref 6–8)
RBC # BLD: 5.78 M/UL — SIGNIFICANT CHANGE UP (ref 4.7–6.1)
RBC # FLD: 19.1 % — HIGH (ref 11.5–14.5)
SODIUM SERPL-SCNC: 141 MMOL/L — SIGNIFICANT CHANGE UP (ref 135–146)
SODIUM SERPL-SCNC: 148 MMOL/L — HIGH (ref 135–146)
T4 FREE SERPL-MCNC: 1.4 NG/DL — SIGNIFICANT CHANGE UP (ref 0.9–1.8)
TRIGL SERPL-MCNC: 91 MG/DL — SIGNIFICANT CHANGE UP
TROPONIN T SERPL-MCNC: 0.08 NG/ML — CRITICAL HIGH
TROPONIN T SERPL-MCNC: 0.09 NG/ML — CRITICAL HIGH
WBC # BLD: 10.09 K/UL — SIGNIFICANT CHANGE UP (ref 4.8–10.8)
WBC # FLD AUTO: 10.09 K/UL — SIGNIFICANT CHANGE UP (ref 4.8–10.8)

## 2022-06-17 PROCEDURE — 99232 SBSQ HOSP IP/OBS MODERATE 35: CPT

## 2022-06-17 PROCEDURE — 93458 L HRT ARTERY/VENTRICLE ANGIO: CPT | Mod: 26,1L

## 2022-06-17 RX ORDER — POTASSIUM CHLORIDE 20 MEQ
20 PACKET (EA) ORAL
Refills: 0 | Status: COMPLETED | OUTPATIENT
Start: 2022-06-17 | End: 2022-06-17

## 2022-06-17 RX ORDER — POTASSIUM CHLORIDE 20 MEQ
20 PACKET (EA) ORAL
Refills: 0 | Status: DISCONTINUED | OUTPATIENT
Start: 2022-06-17 | End: 2022-06-17

## 2022-06-17 RX ORDER — MAGNESIUM SULFATE 500 MG/ML
2 VIAL (ML) INJECTION ONCE
Refills: 0 | Status: COMPLETED | OUTPATIENT
Start: 2022-06-17 | End: 2022-06-17

## 2022-06-17 RX ORDER — BUMETANIDE 0.25 MG/ML
2 INJECTION INTRAMUSCULAR; INTRAVENOUS
Refills: 0 | Status: COMPLETED | OUTPATIENT
Start: 2022-06-17 | End: 2022-06-19

## 2022-06-17 RX ORDER — SACUBITRIL AND VALSARTAN 24; 26 MG/1; MG/1
1 TABLET, FILM COATED ORAL
Refills: 0 | Status: DISCONTINUED | OUTPATIENT
Start: 2022-06-19 | End: 2022-06-23

## 2022-06-17 RX ADMIN — CARVEDILOL PHOSPHATE 3.12 MILLIGRAM(S): 80 CAPSULE, EXTENDED RELEASE ORAL at 18:08

## 2022-06-17 RX ADMIN — BUMETANIDE 2 MILLIGRAM(S): 0.25 INJECTION INTRAMUSCULAR; INTRAVENOUS at 22:08

## 2022-06-17 RX ADMIN — LISINOPRIL 10 MILLIGRAM(S): 2.5 TABLET ORAL at 05:54

## 2022-06-17 RX ADMIN — Medication 40 MILLIGRAM(S): at 05:56

## 2022-06-17 RX ADMIN — CARVEDILOL PHOSPHATE 3.12 MILLIGRAM(S): 80 CAPSULE, EXTENDED RELEASE ORAL at 05:54

## 2022-06-17 RX ADMIN — Medication 20 MILLIEQUIVALENT(S): at 15:54

## 2022-06-17 RX ADMIN — Medication 25 GRAM(S): at 11:35

## 2022-06-17 RX ADMIN — Medication 50 MILLIEQUIVALENT(S): at 11:36

## 2022-06-17 RX ADMIN — CHLORHEXIDINE GLUCONATE 1 APPLICATION(S): 213 SOLUTION TOPICAL at 06:10

## 2022-06-17 RX ADMIN — ENOXAPARIN SODIUM 40 MILLIGRAM(S): 100 INJECTION SUBCUTANEOUS at 22:07

## 2022-06-17 RX ADMIN — Medication 20 MILLIEQUIVALENT(S): at 18:08

## 2022-06-17 NOTE — PROGRESS NOTE ADULT - ASSESSMENT
Impression  Undefined Cardiomyopathy, enoch-myocarditis?  Hypertensive urgency  Hypothyroidism  Strong family history of CAD    Plan    CNS  no sedation    HEENT  Oral care    PULM  HOB at 43    CARDIO  - Bedside TTE showing EF 20%, small to moderate pericardial effusion, IVC dilated  - RVP (-); ESR nL, CRP elevated  - keep K>4 and Mg>2  -c/w Lasix 40mg IV  - LHC/RHC 6/17/22  - Follow up lipid panel, Hgb A1c, T3, fT4  - Follow up official Echo    RENAL  Monitor Sr Cr  Correct electrolytes as needed     GI  GI ppx as needed    HEM  DVT ppx    Dispo  SDU     Impression  Undefined Cardiomyopathy, enoch-myocarditis?  Hypertensive urgency  Hypothyroidism  Strong family history of CAD    Plan    CNS  no sedation    HEENT  Oral care    PULM  HOB at 43    CARDIO  - Bedside TTE showing EF 20%, small to moderate pericardial effusion, IVC dilated  - RVP (-); ESR nL, CRP elevated  - keep K>4 and Mg>2  -c/w Lasix 40mg IV  - LHC/RHC 6/17/22  - Follow up lipid panel, Hgb A1c, T3, fT4  -  official Echo: EF<20%; GIIIDD    RENAL  Monitor Sr Cr  Correct electrolytes as needed     GI  GI ppx as needed    HEM  DVT ppx    Dispo  SDU     Impression  ·	Undefined Cardiomyopathy  ·	Hypertensive urgency, resolved   ·	Hypothyroidism  ·	family history of CAD  ---------------------------------------------  EKG Sinus tachycardia , LAE, LAFB, non specific T wave changes  ECHO 6/17 EF less than 20%, grade III DD, reduced RV function, mild MR, mild TR, biatrial enlargement     Plan    CNS  no sedation    HEENT  Oral care    PULM  HOB at 43  O2 PRN    CARDIO  - keep K>4 and Mg>2  -c/w Lasix 40mg IV  -cont with coreg 3.125 BID  -cont with lisinopril 10 mg daily  - LHC/RHC today      RENAL  Monitor Sr Cr  Correct electrolytes as needed     GI  GI ppx as needed    HEM  DVT ppx    Dispo  SDU

## 2022-06-17 NOTE — CHART NOTE - NSCHARTNOTEFT_GEN_A_CORE
INTERVENTIONAL CARDIOLOGY NP    No precath hydration administered d/t acute systolic HF requiring IV lasix as d/w Dr. Rod

## 2022-06-17 NOTE — CONSULT NOTE ADULT - SUBJECTIVE AND OBJECTIVE BOX
Date of Admission: 22    CHIEF COMPLAINT: Patient is a 32y old  Male who presents with a chief complaint of [place]-myocarditis (2022 07:52)    HISTORY OF PRESENT ILLNESS: 32M w/ no PMHx presents after he woke up from sleep w/ palpitations and SOB. Pt reports about a week ago he vomitted and since then he has been having episodes of dry cough, myalgia, and "Constricted" like feeling around chest. He also noticed that past few nights he has been sleeping with head elevation because it felt comfortable. No prior episodes of the same. Currently only c/o fatigue. Denies fever, chills, CP, SOB, palpitations, cough, wheezing, abd pain, nausea, vomiting, diarrhea, constipation, dysuria, or abnormal bleeding.  No Hx of COVID-19 infection. Received total 3 moderna shots. (2022 12:33)    Patient states he has had no previous cardiac history, does not follow with a cardiologist, and has never had any cardiac procedures or testing prior to this admission. Patient reports 6-8 days prior to presentation he was experiencing n/v, dry cough, abdominal bloating, and chest "constriction". The night prior to presentation he was experiencing orthopnea and palpitations, prompting this admission. He stated he has had no issues ambulating until about 6 months ago when he noticed he would get "winded" frequently, but attributes this to weight gain (states his weight is always going up and down 2/2 his diet which includes frequent take-out). Patient has only been hospitalized once for kidney stones 10 years ago, and has no other past medical history to report.    PAST MEDICAL & SURGICAL HISTORY:  No pertinent past medical history  No pertinent past surgical history      FAMILY HISTORY:  Mother: Alive and well at 62 y/o  Father:  at 62 y/o from cardiac arrest (also had kidney failure and autoimmune disease)  Grandfather (on mom's side)  of MI      SOCIAL HISTORY:  Denies smoking or drug use. Rare alcohol use.       Allergies  No Known Allergies    Intolerances    	    REVIEW OF SYSTEMS:  CONSTITUTIONAL: No fever, weight loss, or fatigue.  CARDIOLOGY: Patient denies chest pain, shortness of breath or syncopal episodes.   RESPIRATORY: denies shortness of breath, wheezing.   NEUROLOGICAL: No weakness, no focal deficits to report.  GI: no BRBPR, + n/v, no diarrhea.    PSYCHIATRY: normal mood and affect  HEENT: no nasal discharge, no ecchymosis  SKIN: no ecchymosis, no breakdown  MUSCULOSKELETAL: Full range of motion x4.     PHYSICAL EXAM:  General Appearance: obese, normal for age and gender. 	  Neck: normal JVP, no bruit.   Eyes: Extra Ocular muscles intact.   Cardiovascular: regular rate and rhythm S1 S2, No JVD, No murmurs, +1 R LE pedal edema  Respiratory: Lungs clear to auscultation	  Psychiatry: Alert and oriented x 3, Mood & affect appropriate  Gastrointestinal:  Soft, Non-tender  Skin/Integumen: No rashes, No ecchymoses, No cyanosis	  Neurologic: Non-focal  Musculoskeletal/ extremities: Normal range of motion, No clubbing, cyanosis, +1 R LE edema  Vascular: Peripheral pulses palpable 2+ bilaterally      CARDIAC MARKERS:  Serum Pro-Brain Natriuretic Peptide: 1827 pg/mL (22 @ 08:00)        TELEMETRY EVENTS: 	    EC22    Ventricular Rate 114 BPM  Atrial Rate 114 BPM  P-R Interval 138 ms  QRS Duration 98 ms  Q-T Interval 352 ms  QTC Calculation(Bazett) 485 ms  P Axis 55 degrees  R Axis -65 degrees  T Axis 88 degrees    Diagnosis Line Sinus tachycardia  Left atrial enlargement  Left anterior fascicular block  Nonspecific T wave abnormality  Abnormal ECG    Confirmed by Oseas Mart ([id number]) on 2022 8:03:25 AM      PREVIOUS DIAGNOSTIC TESTING:    TTE 22    Summary:  1. Left ventricular ejection fraction, by visual estimation, is <20%.   2. Elevated left ventricular end-diastolic pressure.   3. Mildly increased LV wall thickness.   4. Spectral Doppler shows restrictive pattern of left ventricular   myocardial filling (Grade III diastolic dysfunction).   5. Mildly enlarged right ventricle.   6. Severely reduced RV systolic function.   7. Moderately enlarged left atrium.   8. Moderately enlarged right atrium.   9. Mild mitral valve regurgitation.  10. Mild tricuspid regurgitation.  11. Mild aortic regurgitation.  12. Estimated pulmonary artery systolic pressure is 46.6 mmHg assuming a   right atrial pressure of 3 mmHg, which is consistent with mild pulmonary   hypertension.        Cardiac Catheterization: 22    FINDINGS:     Coronary Dominance: right dominant    LM: normal    LAD: normal  D1: normal  D2: normal    LCX:  normal    RCA: normal        LVEDP: 39 mmHg     EF: 20%     	    Home Medications:    MEDICATIONS  (STANDING):  carvedilol 3.125 milliGRAM(s) Oral every 12 hours  chlorhexidine 4% Liquid 1 Application(s) Topical <User Schedule>  enoxaparin Injectable 40 milliGRAM(s) SubCutaneous every 24 hours  furosemide   Injectable 40 milliGRAM(s) IV Push daily  lisinopril 10 milliGRAM(s) Oral daily  potassium chloride  20 mEq/100 mL IVPB 20 milliEquivalent(s) IV Intermittent every 2 hours    MEDICATIONS  (PRN):         Date of Admission: 22    CHIEF COMPLAINT: Patient is a 32y old  Male who presents with a chief complaint of [place]-myocarditis (2022 07:52)    HISTORY OF PRESENT ILLNESS: 32M w/ no PMHx presents after he woke up from sleep w/ palpitations and SOB. Pt reports about a week ago he vomitted and since then he has been having episodes of dry cough, myalgia, and "Constricted" like feeling around chest. He also noticed that past few nights he has been sleeping with head elevation because it felt comfortable. No prior episodes of the same. Currently only c/o fatigue. Denies fever, chills, CP, SOB, palpitations, cough, wheezing, abd pain, nausea, vomiting, diarrhea, constipation, dysuria, or abnormal bleeding.  No Hx of COVID-19 infection. Received total 3 moderna shots. (2022 12:33)    Patient states he has had no previous cardiac history, does not follow with a cardiologist, and has never had any cardiac procedures or testing prior to this admission. Patient reports 6-8 days prior to presentation he was experiencing n/v, dry cough, abdominal bloating, and chest "constriction". The night prior to presentation he was experiencing orthopnea and palpitations, prompting this admission. He stated he has had no issues ambulating until about 2 months ago when he noticed he would get "winded" more easily but attributed this to weight gain (states his weight is always going up and down 2/2 his diet which includes frequent take-out). Patient has only been hospitalized once for kidney stones 10 years ago, and has no other past medical history to report.    PAST MEDICAL & SURGICAL HISTORY:  No pertinent past medical history  No pertinent past surgical history      FAMILY HISTORY:  Mother: Alive and well at 62 y/o  Father:  at 62 y/o from cardiac arrest (also had kidney failure and autoimmune disease)  Grandfather (on mom's side)  of MI      SOCIAL HISTORY:  Denies smoking or drug use. Rare alcohol use.       Allergies  No Known Allergies    Intolerances    	    REVIEW OF SYSTEMS:  CONSTITUTIONAL: No fever, weight loss, or fatigue.  CARDIOLOGY: Patient denies chest pain, shortness of breath or syncopal episodes.   RESPIRATORY: denies shortness of breath, wheezing.   NEUROLOGICAL: No weakness, no focal deficits to report.  GI: no BRBPR, + n/v, no diarrhea.    PSYCHIATRY: normal mood and affect  HEENT: no nasal discharge, no ecchymosis  SKIN: no ecchymosis, no breakdown  MUSCULOSKELETAL: Full range of motion x4.     PHYSICAL EXAM:  General Appearance: obese, normal for age and gender. 	  Neck: normal JVP, no bruit.   Eyes: Extra Ocular muscles intact.   Cardiovascular: regular rate and rhythm S1 S2, No JVD, No murmurs, +1-2 B/L LE pedal edema R>L  Respiratory: Lungs clear to auscultation	  Psychiatry: Alert and oriented x 3, Mood & affect appropriate  Gastrointestinal:  Soft, Non-tender  Skin/Integumen: No rashes, No ecchymoses, No cyanosis	  Neurologic: Non-focal  Musculoskeletal/ extremities: Normal range of motion, No clubbing, cyanosis, +1-2 B/L LE edema R>L  Vascular: Peripheral pulses palpable 2+ bilaterally      CARDIAC MARKERS:  Serum Pro-Brain Natriuretic Peptide: 1827 pg/mL (22 @ 08:00)        TELEMETRY EVENTS: 	    EC22    Ventricular Rate 114 BPM  Atrial Rate 114 BPM  P-R Interval 138 ms  QRS Duration 98 ms  Q-T Interval 352 ms  QTC Calculation(Bazett) 485 ms  P Axis 55 degrees  R Axis -65 degrees  T Axis 88 degrees    Diagnosis Line Sinus tachycardia  Left atrial enlargement  Left anterior fascicular block  Nonspecific T wave abnormality  Abnormal ECG    Confirmed by Oseas Mart ([id number]) on 2022 8:03:25 AM      PREVIOUS DIAGNOSTIC TESTING:    TTE 22    Summary:  1. Left ventricular ejection fraction, by visual estimation, is <20%.   2. Elevated left ventricular end-diastolic pressure.   3. Mildly increased LV wall thickness.   4. Spectral Doppler shows restrictive pattern of left ventricular   myocardial filling (Grade III diastolic dysfunction).   5. Mildly enlarged right ventricle.   6. Severely reduced RV systolic function.   7. Moderately enlarged left atrium.   8. Moderately enlarged right atrium.   9. Mild mitral valve regurgitation.  10. Mild tricuspid regurgitation.  11. Mild aortic regurgitation.  12. Estimated pulmonary artery systolic pressure is 46.6 mmHg assuming a   right atrial pressure of 3 mmHg, which is consistent with mild pulmonary   hypertension.        Cardiac Catheterization: 22    FINDINGS:     Coronary Dominance: right dominant    LM: normal    LAD: normal  D1: normal  D2: normal    LCX:  normal    RCA: normal        LVEDP: 39 mmHg     EF: 20%     	    Home Medications:    MEDICATIONS  (STANDING):  carvedilol 3.125 milliGRAM(s) Oral every 12 hours  chlorhexidine 4% Liquid 1 Application(s) Topical <User Schedule>  enoxaparin Injectable 40 milliGRAM(s) SubCutaneous every 24 hours  furosemide   Injectable 40 milliGRAM(s) IV Push daily  lisinopril 10 milliGRAM(s) Oral daily  potassium chloride  20 mEq/100 mL IVPB 20 milliEquivalent(s) IV Intermittent every 2 hours    MEDICATIONS  (PRN):

## 2022-06-17 NOTE — CHART NOTE - NSCHARTNOTEFT_GEN_A_CORE
PRE-OP DIAGNOSIS:    cardiomyopathy, low EF, un-specified cardiomyopathy    PROCEDURE:     [x] Coronary Angiogram     [x] LHC     [x] LVG     [] RHC     [] Intervention (see below)         PHYSICIAN:  Dr. Rod    ASSISTANT:  Dr. Huang       PROCEDURE DESCRIPTION:     Consent:      [x] Patient     [] Family Member     []  Used        Anesthesia:     [] General     [x] Sedation     [x] Local        Access & Closure:     [x] 6 Fr right Radial Artery (TR band)    [] Fr Femoral Artery     [] Fr Femoral Vein     [] Fr Brachial Vein       IV Contrast: 50 mL        Intervention: none    Implants: none       FINDINGS:     Coronary Dominance: right dominant    LM: normal    LAD: normal  D1:     CX:     Ramus:     RCA:     LIMA:     SVG:        LVEDP: mmHg     EF: %        ESTIMATED BLOOD LOSS: < 10 mL        CONDITION:     [] Good     [] Fair     [] Critical        SPECIMEN REMOVED: N/A       POST-OP DIAGNOSIS:      [] Normal Coronary Angiogram     [] Mild Coronary Artery Disease (< 50% stenosis)     [] __ Vessel Coronary Artery Disease        PLAN OF CARE:     [] D/C Home Today     [] Return to In-patient bed     [] Admit for observation     [] Return for Staged Procedure     [] CT Surgery Consult     [] Medications:     [] IV Fluids: PRE-OP DIAGNOSIS:    cardiomyopathy, low EF, un-specified cardiomyopathy    PROCEDURE:     [x] Coronary Angiogram     [x] LHC     [x] LVG     [] RHC     [] Intervention (see below)         PHYSICIAN:  Dr. Rod    ASSISTANT:  Dr. Huang       PROCEDURE DESCRIPTION:     Consent:      [x] Patient     [] Family Member     []  Used        Anesthesia:     [] General     [x] Sedation     [x] Local        Access & Closure:     [x] 6 Fr right Radial Artery (TR band)    [] Fr Femoral Artery     [] Fr Femoral Vein     [] Fr Brachial Vein       IV Contrast: 50 mL        Intervention: none    Implants: none       FINDINGS:     Coronary Dominance: right dominant    LM: normal    LAD: normal  D1: normal  D2: normal    LCX:  normal    RCA: normal        LVEDP: 39 mmHg     EF: 20%        ESTIMATED BLOOD LOSS: < 10 mL        CONDITION:     [x] Good     [] Fair     [] Critical        SPECIMEN REMOVED: N/A       POST-OP DIAGNOSIS:      [x] Normal Coronary Angiogram     [] Mild Coronary Artery Disease (< 50% stenosis)     [] __ Vessel Coronary Artery Disease        PLAN OF CARE:     [x] Return to In-patient bed     [x] Medications:   - continue with GDMT for HFrEF    [x] IV Fluids: None

## 2022-06-17 NOTE — PROGRESS NOTE ADULT - SUBJECTIVE AND OBJECTIVE BOX
Patient is a 32y old  Male who presents with a chief complaint of [place]-myocarditis (2022 12:33)    HPI:  32M w/ no PMHx presents after he woke up from sleep w/ palpitations and SOB. Pt reports about a week ago he vomitted and since then he has been having episodes of dry cough, myalgia, and "Constricted" like feeling around chest. He also noticed that past few nights he has been sleeping with head elevation because it felt comfortable. No prior episodes of the same. Currently only c/o fatigue. Denies fever, chills, CP, SOB, palpitations, cough, wheezing, abd pain, nausea, vomiting, diarrhea, constipation, dysuria, or abnormal bleeding.  No Hx of COVID-19 infection. Received total 3 moderna shots.      (2022 12:33)       INTERVAL HPI/OVERNIGHT EVENTS:   No overnight events   Afebrile, hemodynamically stable     Subjective: Patient is seen and examined at bedside.   Patient has no apparent complaints. Hemodynamically stable.     ICU Vital Signs Last 24 Hrs  T(C): 36.6 (2022 05:34), Max: 37.2 (2022 17:00)  T(F): 97.8 (2022 05:34), Max: 99 (2022 17:00)  HR: 91 (2022 05:34) (84 - 110)  BP: 139/98 (2022 05:34) (121/51 - 178/118)  BP(mean): --  ABP: --  ABP(mean): --  RR: 18 (2022 05:34) (18 - 18)  SpO2: 96% (2022 20:41) (96% - 97%)    I&O's Summary    2022 07:01  -  2022 07:00  --------------------------------------------------------  IN: 0 mL / OUT: 800 mL / NET: -800 mL          Daily Height in cm: 165.1 (2022 20:17)    Daily Weight in k (2022 05:34)    Adult Advanced Hemodynamics Last 24 Hrs  CVP(mm Hg): --  CVP(cm H2O): --  CO: --  CI: --  PA: --  PA(mean): --  PCWP: --  SVR: --  SVRI: --  PVR: --  PVRI: --    EKG/Telemetry Events:    MEDICATIONS  (STANDING):  carvedilol 3.125 milliGRAM(s) Oral every 12 hours  chlorhexidine 4% Liquid 1 Application(s) Topical <User Schedule>  enoxaparin Injectable 40 milliGRAM(s) SubCutaneous every 24 hours  furosemide   Injectable 40 milliGRAM(s) IV Push daily  lisinopril 10 milliGRAM(s) Oral daily    MEDICATIONS  (PRN):      PHYSICAL EXAM:  GENERAL: not in acute distress  NECK: Supple, No JVD  NERVOUS SYSTEM:  Alert & Awake.   CHEST/LUNG: decreased breath sounds bilaterally  HEART: S1S2 normal, no S3, Regular rate and rhythm; No murmurs  ABDOMEN: Soft, Nontender, Nondistended  EXTREMITIES: +1 Peripheral edema, No clubbing, cyanosis,   SKIN: No rashes or lesions    LABS:                        15.6   11.29 )-----------( 300      ( 2022 08:00 )             48.8         148<H>  |  106  |  16  ----------------------------<  97  3.7   |  25  |  1.1    Ca    8.3<L>      2022 01:19  Mg     1.5         TPro  6.6  /  Alb  4.0  /  TBili  1.7<H>  /  DBili  x   /  AST  39  /  ALT  24  /  AlkPhos  89      LIVER FUNCTIONS - ( 2022 08:00 )  Alb: 4.0 g/dL / Pro: 6.6 g/dL / ALK PHOS: 89 U/L / ALT: 24 U/L / AST: 39 U/L / GGT: x             CAPILLARY BLOOD GLUCOSE          Troponin T, Serum: 0.09 ng/mL ( @ 01:19)  Troponin T, Serum: 0.08 ng/mL ( @ 20:36)  Troponin T, Serum: 0.09 ng/mL ( @ 08:00)    CARDIAC MARKERS ( 2022 01:19 )  x     / 0.09 ng/mL / x     / x     / x      CARDIAC MARKERS ( 2022 20:36 )  x     / 0.08 ng/mL / x     / x     / x      CARDIAC MARKERS ( 2022 08:00 )  x     / 0.09 ng/mL / x     / x     / x                RADIOLOGY & ADDITIONAL TESTS:  CXR:        Care Discussed with Consultants/Other Providers [ x] YES  [ ] NO

## 2022-06-17 NOTE — CONSULT NOTE ADULT - ASSESSMENT
Assessment:  32M w/ no PMHx presents after he woke up from sleep w/ palpitations and SOB. 1 week prior was also experiencing symptoms of n/v, dry cough, and chest "constriction". WBC 11. Trop 0.09. Pro BNP 1827. CTA chest negative for PE but with R>L pleural effusions. Given IV lasix, plan for Cath 6/17.     Impression  Acute HFrEF- ECHO 6/17 EF less than 20%, grade III DD, reduced RV function, mild MR, mild TR, biatrial enlargement   NIC - [facility] 6/17   HTN  Obesity  Assessment:  32M w/ no PMHx presents after he woke up from sleep w/ palpitations and SOB. 1 week prior was also experiencing symptoms of n/v, dry cough, and chest "constriction". WBC 11. Trop 0.09. Pro BNP 1827. CTA chest negative for PE but with R>L pleural effusions. Given IV lasix. Cath 6/17- no CAD.      Impression  Acute HFrEF- ECHO 6/17 EF less than 20%, grade III DD, reduced RV function, mild MR, mild TR, biatrial enlargement   NIC - C 6/17 no CAD  HTN  Obesity     Plan:  Patient is mildly fluid overloaded on exam  Furosemide 40mg IVP daily  Coreg 3.125 BID  Needs cardiac MRI  Get BMP daily   Maintain potassium >4.0, Mg >2.2  Strict intake and output  Daily standing weight   Proper low Na dietary counseling provided   Plan discussed with primary team, patient and spouse at bedside   Will continue to follow   Assessment:  32M w/ no PMHx presents after he woke up from sleep w/ palpitations and SOB. 1 week prior was also experiencing symptoms of n/v, dry cough, and chest "constriction". WBC 11. Trop 0.09. Pro BNP 1827. CTA chest negative for PE but with R>L pleural effusions. Given IV lasix. Cath 6/17- no CAD.      Impression  Acute HFrEF- ECHO 6/17 EF less than 20%, grade III DD, reduced RV function, mild MR, mild TR, biatrial enlargement   NIC - C 6/17 no CAD  HTN  Obesity     Plan:  Patient is mildly fluid overloaded on exam  Stop Furosemide 40mg IVP  Start Bumex 2mg IVP BID until Sunday AM (on 6/19 give AM dose only)  Stop lisinopril   Tomorrow 6/18 give Losartan 50mg x1  Sunday 6/19 start Entresto 49/51 BID   Continue Coreg 3.125 BID  Needs cardiac MRI  Get BMP daily   Maintain potassium >4.0, Mg >2.2  Strict intake and output  Daily standing weight   Proper low Na dietary counseling provided   Plan discussed with primary team, patient and spouse at bedside   Will continue to follow

## 2022-06-18 LAB
ALBUMIN SERPL ELPH-MCNC: 3.8 G/DL — SIGNIFICANT CHANGE UP (ref 3.5–5.2)
ALP SERPL-CCNC: 85 U/L — SIGNIFICANT CHANGE UP (ref 30–115)
ALT FLD-CCNC: 18 U/L — SIGNIFICANT CHANGE UP (ref 0–41)
ANION GAP SERPL CALC-SCNC: 14 MMOL/L — SIGNIFICANT CHANGE UP (ref 7–14)
ANION GAP SERPL CALC-SCNC: 16 MMOL/L — HIGH (ref 7–14)
AST SERPL-CCNC: 26 U/L — SIGNIFICANT CHANGE UP (ref 0–41)
BASOPHILS # BLD AUTO: 0.07 K/UL — SIGNIFICANT CHANGE UP (ref 0–0.2)
BASOPHILS NFR BLD AUTO: 0.7 % — SIGNIFICANT CHANGE UP (ref 0–1)
BILIRUB SERPL-MCNC: 1.7 MG/DL — HIGH (ref 0.2–1.2)
BUN SERPL-MCNC: 16 MG/DL — SIGNIFICANT CHANGE UP (ref 10–20)
BUN SERPL-MCNC: 18 MG/DL — SIGNIFICANT CHANGE UP (ref 10–20)
CALCIUM SERPL-MCNC: 7.9 MG/DL — LOW (ref 8.5–10.1)
CALCIUM SERPL-MCNC: 7.9 MG/DL — LOW (ref 8.5–10.1)
CHLORIDE SERPL-SCNC: 98 MMOL/L — SIGNIFICANT CHANGE UP (ref 98–110)
CHLORIDE SERPL-SCNC: 99 MMOL/L — SIGNIFICANT CHANGE UP (ref 98–110)
CO2 SERPL-SCNC: 26 MMOL/L — SIGNIFICANT CHANGE UP (ref 17–32)
CO2 SERPL-SCNC: 27 MMOL/L — SIGNIFICANT CHANGE UP (ref 17–32)
CREAT SERPL-MCNC: 1.1 MG/DL — SIGNIFICANT CHANGE UP (ref 0.7–1.5)
CREAT SERPL-MCNC: 1.2 MG/DL — SIGNIFICANT CHANGE UP (ref 0.7–1.5)
EGFR: 81 ML/MIN/1.73M2 — SIGNIFICANT CHANGE UP
EGFR: 90 ML/MIN/1.73M2 — SIGNIFICANT CHANGE UP
EOSINOPHIL # BLD AUTO: 0.15 K/UL — SIGNIFICANT CHANGE UP (ref 0–0.7)
EOSINOPHIL NFR BLD AUTO: 1.5 % — SIGNIFICANT CHANGE UP (ref 0–8)
GLUCOSE SERPL-MCNC: 80 MG/DL — SIGNIFICANT CHANGE UP (ref 70–99)
GLUCOSE SERPL-MCNC: 97 MG/DL — SIGNIFICANT CHANGE UP (ref 70–99)
HCT VFR BLD CALC: 47.6 % — SIGNIFICANT CHANGE UP (ref 42–52)
HGB BLD-MCNC: 15.4 G/DL — SIGNIFICANT CHANGE UP (ref 14–18)
IMM GRANULOCYTES NFR BLD AUTO: 0.4 % — HIGH (ref 0.1–0.3)
LYMPHOCYTES # BLD AUTO: 2.64 K/UL — SIGNIFICANT CHANGE UP (ref 1.2–3.4)
LYMPHOCYTES # BLD AUTO: 25.6 % — SIGNIFICANT CHANGE UP (ref 20.5–51.1)
MAGNESIUM SERPL-MCNC: 1.5 MG/DL — LOW (ref 1.8–2.4)
MAGNESIUM SERPL-MCNC: 1.5 MG/DL — LOW (ref 1.8–2.4)
MCHC RBC-ENTMCNC: 26.6 PG — LOW (ref 27–31)
MCHC RBC-ENTMCNC: 32.4 G/DL — SIGNIFICANT CHANGE UP (ref 32–37)
MCV RBC AUTO: 82.1 FL — SIGNIFICANT CHANGE UP (ref 80–94)
MONOCYTES # BLD AUTO: 0.76 K/UL — HIGH (ref 0.1–0.6)
MONOCYTES NFR BLD AUTO: 7.4 % — SIGNIFICANT CHANGE UP (ref 1.7–9.3)
NEUTROPHILS # BLD AUTO: 6.66 K/UL — HIGH (ref 1.4–6.5)
NEUTROPHILS NFR BLD AUTO: 64.4 % — SIGNIFICANT CHANGE UP (ref 42.2–75.2)
NRBC # BLD: 0 /100 WBCS — SIGNIFICANT CHANGE UP (ref 0–0)
PHOSPHATE SERPL-MCNC: 3.6 MG/DL — SIGNIFICANT CHANGE UP (ref 2.1–4.9)
PLATELET # BLD AUTO: 275 K/UL — SIGNIFICANT CHANGE UP (ref 130–400)
POTASSIUM SERPL-MCNC: 3.2 MMOL/L — LOW (ref 3.5–5)
POTASSIUM SERPL-MCNC: 3.2 MMOL/L — LOW (ref 3.5–5)
POTASSIUM SERPL-SCNC: 3.2 MMOL/L — LOW (ref 3.5–5)
POTASSIUM SERPL-SCNC: 3.2 MMOL/L — LOW (ref 3.5–5)
PROT SERPL-MCNC: 6.1 G/DL — SIGNIFICANT CHANGE UP (ref 6–8)
RBC # BLD: 5.8 M/UL — SIGNIFICANT CHANGE UP (ref 4.7–6.1)
RBC # FLD: 19.1 % — HIGH (ref 11.5–14.5)
SODIUM SERPL-SCNC: 139 MMOL/L — SIGNIFICANT CHANGE UP (ref 135–146)
SODIUM SERPL-SCNC: 141 MMOL/L — SIGNIFICANT CHANGE UP (ref 135–146)
WBC # BLD: 10.32 K/UL — SIGNIFICANT CHANGE UP (ref 4.8–10.8)
WBC # FLD AUTO: 10.32 K/UL — SIGNIFICANT CHANGE UP (ref 4.8–10.8)

## 2022-06-18 PROCEDURE — 99232 SBSQ HOSP IP/OBS MODERATE 35: CPT

## 2022-06-18 RX ORDER — MAGNESIUM SULFATE 500 MG/ML
2 VIAL (ML) INJECTION ONCE
Refills: 0 | Status: COMPLETED | OUTPATIENT
Start: 2022-06-18 | End: 2022-06-18

## 2022-06-18 RX ORDER — POTASSIUM CHLORIDE 20 MEQ
20 PACKET (EA) ORAL
Refills: 0 | Status: COMPLETED | OUTPATIENT
Start: 2022-06-18 | End: 2022-06-18

## 2022-06-18 RX ORDER — LOSARTAN POTASSIUM 100 MG/1
50 TABLET, FILM COATED ORAL ONCE
Refills: 0 | Status: COMPLETED | OUTPATIENT
Start: 2022-06-18 | End: 2022-06-18

## 2022-06-18 RX ORDER — POTASSIUM CHLORIDE 20 MEQ
20 PACKET (EA) ORAL
Refills: 0 | Status: DISCONTINUED | OUTPATIENT
Start: 2022-06-18 | End: 2022-06-18

## 2022-06-18 RX ORDER — POTASSIUM CHLORIDE 20 MEQ
40 PACKET (EA) ORAL EVERY 4 HOURS
Refills: 0 | Status: COMPLETED | OUTPATIENT
Start: 2022-06-18 | End: 2022-06-18

## 2022-06-18 RX ADMIN — Medication 25 GRAM(S): at 04:46

## 2022-06-18 RX ADMIN — CARVEDILOL PHOSPHATE 3.12 MILLIGRAM(S): 80 CAPSULE, EXTENDED RELEASE ORAL at 17:40

## 2022-06-18 RX ADMIN — Medication 25 GRAM(S): at 11:55

## 2022-06-18 RX ADMIN — Medication 40 MILLIEQUIVALENT(S): at 11:59

## 2022-06-18 RX ADMIN — BUMETANIDE 2 MILLIGRAM(S): 0.25 INJECTION INTRAMUSCULAR; INTRAVENOUS at 20:52

## 2022-06-18 RX ADMIN — Medication 20 MILLIEQUIVALENT(S): at 08:39

## 2022-06-18 RX ADMIN — Medication 20 MILLIEQUIVALENT(S): at 06:24

## 2022-06-18 RX ADMIN — CARVEDILOL PHOSPHATE 3.12 MILLIGRAM(S): 80 CAPSULE, EXTENDED RELEASE ORAL at 05:49

## 2022-06-18 RX ADMIN — ENOXAPARIN SODIUM 40 MILLIGRAM(S): 100 INJECTION SUBCUTANEOUS at 20:53

## 2022-06-18 RX ADMIN — BUMETANIDE 2 MILLIGRAM(S): 0.25 INJECTION INTRAMUSCULAR; INTRAVENOUS at 11:57

## 2022-06-18 RX ADMIN — Medication 40 MILLIEQUIVALENT(S): at 14:03

## 2022-06-18 RX ADMIN — LOSARTAN POTASSIUM 50 MILLIGRAM(S): 100 TABLET, FILM COATED ORAL at 14:03

## 2022-06-18 NOTE — DIETITIAN INITIAL EVALUATION ADULT - ORAL INTAKE PTA/DIET HISTORY
The patient reports following a no concentrated sweets, no added salt diet at home; consumed three meals at <75%; took a probiotic once daily.

## 2022-06-18 NOTE — DIETITIAN INITIAL EVALUATION ADULT - NAME AND PHONE
Intervention: 1.Meals and Snacks  Monitor/Evaluate: Diet order, energy intake, nutrition focused physical findings, K and Mg profile  Goals: 1.Continue to consume 75%-100% of meals in 10 days

## 2022-06-18 NOTE — PROGRESS NOTE ADULT - SUBJECTIVE AND OBJECTIVE BOX
Patient is a 32y old  Male who presents with a chief complaint of [place]-myocarditis (2022 10:15)    HPI:  32M w/ no PMHx presents after he woke up from sleep w/ palpitations and SOB. Pt reports about a week ago he vomitted and since then he has been having episodes of dry cough, myalgia, and "Constricted" like feeling around chest. He also noticed that past few nights he has been sleeping with head elevation because it felt comfortable. No prior episodes of the same. Currently only c/o fatigue. Denies fever, chills, CP, SOB, palpitations, cough, wheezing, abd pain, nausea, vomiting, diarrhea, constipation, dysuria, or abnormal bleeding.  No Hx of COVID-19 infection. Received total 3 moderna shots.      (2022 12:33)       INTERVAL HPI/OVERNIGHT EVENTS:   No overnight events   Afebrile, hemodynamically stable     Subjective: Patient is seen and examined at bedside.   Patient has no CP, no SOB, only soreness with improvement with rubbing.  Hemodynamically stable.     ICU Vital Signs Last 24 Hrs  T(C): 36.1 (2022 04:00), Max: 36.2 (2022 20:00)  T(F): 97 (2022 04:00), Max: 97.1 (2022 20:00)  HR: 77 (2022 04:00) (77 - 92)  BP: 133/78 (2022 04:00) (133/78 - 148/78)  BP(mean): --  ABP: --  ABP(mean): --  RR: 18 (2022 04:00) (18 - 18)  SpO2: 96% (2022 16:05) (96% - 96%)    I&O's Summary    2022 07:01  -  2022 07:00  --------------------------------------------------------  IN: 0 mL / OUT: 2800 mL / NET: -2800 mL          Daily     Daily Weight in k (2022 04:00)    Adult Advanced Hemodynamics Last 24 Hrs  CVP(mm Hg): --  CVP(cm H2O): --  CO: --  CI: --  PA: --  PA(mean): --  PCWP: --  SVR: --  SVRI: --  PVR: --  PVRI: --    EKG/Telemetry Events:    MEDICATIONS  (STANDING):  buMETAnide Injectable 2 milliGRAM(s) IV Push two times a day  carvedilol 3.125 milliGRAM(s) Oral every 12 hours  chlorhexidine 4% Liquid 1 Application(s) Topical <User Schedule>  enoxaparin Injectable 40 milliGRAM(s) SubCutaneous every 24 hours  potassium chloride    Tablet ER 20 milliEquivalent(s) Oral every 2 hours    MEDICATIONS  (PRN):      PHYSICAL EXAM:  GENERAL: not in acute distress  NECK: Supple, No JVD  NERVOUS SYSTEM:  Alert & Awake.   CHEST/LUNG: decreased breath sounds bilaterally  HEART: S1S2 normal, no S3, Regular rate and rhythm; No murmurs  ABDOMEN: Soft, Nontender, Nondistended  EXTREMITIES: +1 Peripheral edema, No clubbing, cyanosis,   SKIN: No rashes or lesions  LABS:                        15.4   10.32 )-----------( 275      ( 2022 05:46 )             47.6     06-18    141  |  99  |  16  ----------------------------<  80  3.2<L>   |  26  |  1.1    Ca    7.9<L>      2022 05:46  Phos  3.6     06-18  Mg     1.5     06-18    TPro  6.1  /  Alb  3.8  /  TBili  1.7<H>  /  DBili  x   /  AST  26  /  ALT  18  /  AlkPhos  85  06-18    LIVER FUNCTIONS - ( 2022 05:46 )  Alb: 3.8 g/dL / Pro: 6.1 g/dL / ALK PHOS: 85 U/L / ALT: 18 U/L / AST: 26 U/L / GGT: x             CAPILLARY BLOOD GLUCOSE            CARDIAC MARKERS ( 2022 08:11 )  x     / 0.08 ng/mL / x     / x     / x      CARDIAC MARKERS ( 2022 01:19 )  x     / 0.09 ng/mL / x     / x     / x      CARDIAC MARKERS ( 2022 20:36 )  x     / 0.08 ng/mL / x     / x     / x                RADIOLOGY & ADDITIONAL TESTS:  CXR:        Care Discussed with Consultants/Other Providers [ x] YES  [ ] NO

## 2022-06-18 NOTE — DIETITIAN INITIAL EVALUATION ADULT - NSFNSGIIOFT_GEN_A_CORE
Dx: 33y/o male presented after he woke up from sleep with palpitations and SOB. Admitted with enoch-myocarditis. Found to have non-ischemic dilated cardiomyopathy, hypertensive urgency (resolved), hypothyroidism and EF less than 20%, grade III DD, reduced RV function, mild MR, mild TR, biatrial enlargement   per echocardiogram (6/17).

## 2022-06-18 NOTE — PROGRESS NOTE ADULT - ASSESSMENT
Impression  ·	Undefined Cardiomyopathy  ·	Hypertensive urgency, resolved   ·	Hypothyroidism  ·	family history of CAD  ---------------------------------------------  EKG Sinus tachycardia , LAE, LAFB, non specific T wave changes  ECHO 6/17 EF less than 20%, grade III DD, reduced RV function, mild MR, mild TR, biatrial enlargement   s/p negative Cath    Plan    CNS  no sedation    HEENT  Oral care    PULM  HOB at 45  O2 PRN    CARDIO  - keep K>4 and Mg>2  -c/w Lasix 40mg IV  -cont with coreg 3.125 BID  -cont with lisinopril 10 mg daily  - LHC/RHC - negative  - MRI Heart today - will follow       RENAL  Monitor Sr Cr  Correct electrolytes as needed     GI  GI ppx as needed    HEM  DVT ppx    Dispo  SDU   Impression  ·	Non-ischemic dilated Cardiomyopathy  ·	Hypertensive urgency, resolved   ·	Hypothyroidism  ·	family history of Heart disease  ---------------------------------------------  EKG Sinus tachycardia , LAE, LAFB, non specific T wave changes  ECHO 6/17 EF less than 20%, grade III DD, reduced RV function, mild MR, mild TR, biatrial enlargement   s/p negative Cath    Plan    CNS  no sedation    HEENT  Oral care    PULM  HOB at 45  O2 PRN    CARDIO  - keep K>4 and Mg>2  - cont with coreg 3.125 BID  - LHC - negative for CAD  - Cardiac MR pending  - HF recommendation appreciated  - Bumex 2mg IV BID  - Entresto 49/51 mg BID starting tomorrow       RENAL  Monitor Sr Cr  Correct electrolytes as needed     GI  GI ppx as needed    HEM  DVT ppx    Dispo  SDU

## 2022-06-18 NOTE — DIETITIAN INITIAL EVALUATION ADULT - PERTINENT MEDS FT
MEDICATIONS  (STANDING):  buMETAnide Injectable 2 milliGRAM(s) IV Push two times a day  carvedilol 3.125 milliGRAM(s) Oral every 12 hours  chlorhexidine 4% Liquid 1 Application(s) Topical <User Schedule>  enoxaparin Injectable 40 milliGRAM(s) SubCutaneous every 24 hours    MEDICATIONS  (PRN):

## 2022-06-18 NOTE — DIETITIAN INITIAL EVALUATION ADULT - PERTINENT LABORATORY DATA
06-18    141  |  99  |  16  ----------------------------<  80  3.2<L>   |  26  |  1.1    Ca    7.9<L>      18 Jun 2022 05:46  Phos  3.6     06-18  Mg     1.5     06-18    TPro  6.1  /  Alb  3.8  /  TBili  1.7<H>  /  DBili  x   /  AST  26  /  ALT  18  /  AlkPhos  85  06-18

## 2022-06-18 NOTE — DIETITIAN INITIAL EVALUATION ADULT - OTHER CALCULATIONS
Estimated Calorie Needs: 1550-1860kcal/day (25-30kcal/kg of IBW-62kg) -Due to obesity and CHF  Estimated Protein Needs: 62-74grams/day (1-1.2grams/kg of IBW-62kg) -Due to obesity  Estimated Fluid Needs: Fluids per LIP -Due to CHF

## 2022-06-19 LAB
ALBUMIN SERPL ELPH-MCNC: 4 G/DL — SIGNIFICANT CHANGE UP (ref 3.5–5.2)
ALP SERPL-CCNC: 88 U/L — SIGNIFICANT CHANGE UP (ref 30–115)
ALT FLD-CCNC: 20 U/L — SIGNIFICANT CHANGE UP (ref 0–41)
ANION GAP SERPL CALC-SCNC: 13 MMOL/L — SIGNIFICANT CHANGE UP (ref 7–14)
AST SERPL-CCNC: 25 U/L — SIGNIFICANT CHANGE UP (ref 0–41)
BASOPHILS # BLD AUTO: 0.06 K/UL — SIGNIFICANT CHANGE UP (ref 0–0.2)
BASOPHILS NFR BLD AUTO: 0.6 % — SIGNIFICANT CHANGE UP (ref 0–1)
BILIRUB SERPL-MCNC: 1.4 MG/DL — HIGH (ref 0.2–1.2)
BUN SERPL-MCNC: 17 MG/DL — SIGNIFICANT CHANGE UP (ref 10–20)
CALCIUM SERPL-MCNC: 7.9 MG/DL — LOW (ref 8.5–10.1)
CHLORIDE SERPL-SCNC: 102 MMOL/L — SIGNIFICANT CHANGE UP (ref 98–110)
CO2 SERPL-SCNC: 30 MMOL/L — SIGNIFICANT CHANGE UP (ref 17–32)
CREAT SERPL-MCNC: 0.9 MG/DL — SIGNIFICANT CHANGE UP (ref 0.7–1.5)
EGFR: 115 ML/MIN/1.73M2 — SIGNIFICANT CHANGE UP
EOSINOPHIL # BLD AUTO: 0.18 K/UL — SIGNIFICANT CHANGE UP (ref 0–0.7)
EOSINOPHIL NFR BLD AUTO: 1.8 % — SIGNIFICANT CHANGE UP (ref 0–8)
GLUCOSE SERPL-MCNC: 139 MG/DL — HIGH (ref 70–99)
HCT VFR BLD CALC: 48.2 % — SIGNIFICANT CHANGE UP (ref 42–52)
HGB BLD-MCNC: 15.2 G/DL — SIGNIFICANT CHANGE UP (ref 14–18)
IMM GRANULOCYTES NFR BLD AUTO: 0.3 % — SIGNIFICANT CHANGE UP (ref 0.1–0.3)
LYMPHOCYTES # BLD AUTO: 2.65 K/UL — SIGNIFICANT CHANGE UP (ref 1.2–3.4)
LYMPHOCYTES # BLD AUTO: 26.1 % — SIGNIFICANT CHANGE UP (ref 20.5–51.1)
MAGNESIUM SERPL-MCNC: 1.4 MG/DL — LOW (ref 1.8–2.4)
MCHC RBC-ENTMCNC: 26 PG — LOW (ref 27–31)
MCHC RBC-ENTMCNC: 31.5 G/DL — LOW (ref 32–37)
MCV RBC AUTO: 82.4 FL — SIGNIFICANT CHANGE UP (ref 80–94)
MONOCYTES # BLD AUTO: 0.61 K/UL — HIGH (ref 0.1–0.6)
MONOCYTES NFR BLD AUTO: 6 % — SIGNIFICANT CHANGE UP (ref 1.7–9.3)
NEUTROPHILS # BLD AUTO: 6.62 K/UL — HIGH (ref 1.4–6.5)
NEUTROPHILS NFR BLD AUTO: 65.2 % — SIGNIFICANT CHANGE UP (ref 42.2–75.2)
NRBC # BLD: 0 /100 WBCS — SIGNIFICANT CHANGE UP (ref 0–0)
PHOSPHATE SERPL-MCNC: 3.1 MG/DL — SIGNIFICANT CHANGE UP (ref 2.1–4.9)
PLATELET # BLD AUTO: 267 K/UL — SIGNIFICANT CHANGE UP (ref 130–400)
POTASSIUM SERPL-MCNC: 3 MMOL/L — LOW (ref 3.5–5)
POTASSIUM SERPL-SCNC: 3 MMOL/L — LOW (ref 3.5–5)
PROT SERPL-MCNC: 6.4 G/DL — SIGNIFICANT CHANGE UP (ref 6–8)
RBC # BLD: 5.85 M/UL — SIGNIFICANT CHANGE UP (ref 4.7–6.1)
RBC # FLD: 19.3 % — HIGH (ref 11.5–14.5)
SODIUM SERPL-SCNC: 145 MMOL/L — SIGNIFICANT CHANGE UP (ref 135–146)
WBC # BLD: 10.15 K/UL — SIGNIFICANT CHANGE UP (ref 4.8–10.8)
WBC # FLD AUTO: 10.15 K/UL — SIGNIFICANT CHANGE UP (ref 4.8–10.8)

## 2022-06-19 PROCEDURE — 99232 SBSQ HOSP IP/OBS MODERATE 35: CPT

## 2022-06-19 RX ORDER — MAGNESIUM SULFATE 500 MG/ML
2 VIAL (ML) INJECTION EVERY 4 HOURS
Refills: 0 | Status: COMPLETED | OUTPATIENT
Start: 2022-06-19 | End: 2022-06-19

## 2022-06-19 RX ORDER — POTASSIUM CHLORIDE 20 MEQ
40 PACKET (EA) ORAL EVERY 4 HOURS
Refills: 0 | Status: COMPLETED | OUTPATIENT
Start: 2022-06-19 | End: 2022-06-19

## 2022-06-19 RX ORDER — MAGNESIUM SULFATE 500 MG/ML
2 VIAL (ML) INJECTION ONCE
Refills: 0 | Status: COMPLETED | OUTPATIENT
Start: 2022-06-19 | End: 2022-06-19

## 2022-06-19 RX ADMIN — ENOXAPARIN SODIUM 40 MILLIGRAM(S): 100 INJECTION SUBCUTANEOUS at 20:20

## 2022-06-19 RX ADMIN — Medication 25 GRAM(S): at 10:42

## 2022-06-19 RX ADMIN — BUMETANIDE 2 MILLIGRAM(S): 0.25 INJECTION INTRAMUSCULAR; INTRAVENOUS at 05:51

## 2022-06-19 RX ADMIN — Medication 40 MILLIEQUIVALENT(S): at 13:47

## 2022-06-19 RX ADMIN — SACUBITRIL AND VALSARTAN 1 TABLET(S): 24; 26 TABLET, FILM COATED ORAL at 17:41

## 2022-06-19 RX ADMIN — CARVEDILOL PHOSPHATE 3.12 MILLIGRAM(S): 80 CAPSULE, EXTENDED RELEASE ORAL at 17:41

## 2022-06-19 RX ADMIN — Medication 40 MILLIEQUIVALENT(S): at 08:53

## 2022-06-19 RX ADMIN — Medication 25 GRAM(S): at 13:47

## 2022-06-19 RX ADMIN — Medication 25 GRAM(S): at 17:42

## 2022-06-19 RX ADMIN — Medication 40 MILLIEQUIVALENT(S): at 10:42

## 2022-06-19 RX ADMIN — CHLORHEXIDINE GLUCONATE 1 APPLICATION(S): 213 SOLUTION TOPICAL at 05:51

## 2022-06-19 RX ADMIN — CARVEDILOL PHOSPHATE 3.12 MILLIGRAM(S): 80 CAPSULE, EXTENDED RELEASE ORAL at 05:51

## 2022-06-19 RX ADMIN — SACUBITRIL AND VALSARTAN 1 TABLET(S): 24; 26 TABLET, FILM COATED ORAL at 05:51

## 2022-06-19 RX ADMIN — Medication 20 MILLIGRAM(S): at 20:20

## 2022-06-19 NOTE — PROGRESS NOTE ADULT - SUBJECTIVE AND OBJECTIVE BOX
Patient is a 32y old  Male who presents with a chief complaint of MYOCARDITIS     (2022 22:48)    HPI:  32M w/ no PMHx presents after he woke up from sleep w/ palpitations and SOB. Pt reports about a week ago he vomitted and since then he has been having episodes of dry cough, myalgia, and "Constricted" like feeling around chest. He also noticed that past few nights he has been sleeping with head elevation because it felt comfortable. No prior episodes of the same. Currently only c/o fatigue. Denies fever, chills, CP, SOB, palpitations, cough, wheezing, abd pain, nausea, vomiting, diarrhea, constipation, dysuria, or abnormal bleeding.  No Hx of COVID-19 infection. Received total 3 moderna shots.      (2022 12:33)       INTERVAL HPI/OVERNIGHT EVENTS:   No overnight events   Afebrile, hemodynamically stable     Subjective:    ICU Vital Signs Last 24 Hrs  T(C): 36.8 (2022 08:00), Max: 37.9 (2022 18:00)  T(F): 98.3 (2022 08:00), Max: 100.2 (2022 18:00)  HR: 65 (2022 08:00) (65 - 93)  BP: 146/86 (2022 08:00) (134/88 - 171/102)  BP(mean): --  ABP: --  ABP(mean): --  RR: 18 (2022 08:00) (18 - 18)  SpO2: 97% (2022 19:55) (97% - 97%)    I&O's Summary    2022 07:01  -  2022 07:00  --------------------------------------------------------  IN: 670 mL / OUT: 2650 mL / NET: -1980 mL    2022 07:01  -  2022 09:07  --------------------------------------------------------  IN: 0 mL / OUT: 400 mL / NET: -400 mL          Daily     Daily Weight in k.6 (2022 05:26)    Adult Advanced Hemodynamics Last 24 Hrs  CVP(mm Hg): --  CVP(cm H2O): --  CO: --  CI: --  PA: --  PA(mean): --  PCWP: --  SVR: --  SVRI: --  PVR: --  PVRI: --    EKG/Telemetry Events:   No tele events noted    MEDICATIONS  (STANDING):  carvedilol 3.125 milliGRAM(s) Oral every 12 hours  chlorhexidine 4% Liquid 1 Application(s) Topical <User Schedule>  enoxaparin Injectable 40 milliGRAM(s) SubCutaneous every 24 hours  magnesium sulfate  IVPB 2 Gram(s) IV Intermittent every 4 hours  potassium chloride    Tablet ER 40 milliEquivalent(s) Oral every 4 hours  sacubitril 49 mG/valsartan 51 mG 1 Tablet(s) Oral two times a day    MEDICATIONS  (PRN):      PHYSICAL EXAM:  HEAD:  Atraumatic, Normocephalic  EYES: EOMI, PERRLA, conjunctiva and sclera clear  NECK: Supple, No JVD, Normal thyroid, no enlarged nodes  NERVOUS SYSTEM:  Alert & Awake.   CHEST/LUNG: B/L good air entry; No rales, rhonchi, or wheezing  HEART: S1S2 normal, no S3, Regular rate and rhythm; No murmurs  ABDOMEN: Soft, Nontender, Nondistended; Bowel sounds present  EXTREMITIES:  2+ Peripheral Pulses, No clubbing, cyanosis, minimal LE (+1) edema  LYMPH: No lymphadenopathy noted  SKIN: No rashes or lesions    LABS:                        15.2   10.15 )-----------( 267      ( 2022 06:54 )             48.2     -    145  |  102  |  17  ----------------------------<  139<H>  3.0<L>   |  30  |  0.9    Ca    7.9<L>      2022 06:54  Phos  3.1     -  Mg     1.4         TPro  6.4  /  Alb  4.0  /  TBili  1.4<H>  /  DBili  x   /  AST  25  /  ALT  20  /  AlkPhos  88      LIVER FUNCTIONS - ( 2022 06:54 )  Alb: 4.0 g/dL / Pro: 6.4 g/dL / ALK PHOS: 88 U/L / ALT: 20 U/L / AST: 25 U/L / GGT: x             CAPILLARY BLOOD GLUCOSE        Summary:  1. Left ventricular ejection fraction, by visual estimation, is <20%.   2. Elevated left ventricular end-diastolic pressure.   3. Mildly increased LV wall thickness.   4. Spectral Doppler shows restrictive pattern of left ventricular   myocardial filling (Grade III diastolic dysfunction).   5. Mildly enlarged right ventricle.   6. Severely reduced RV systolic function.   7. Moderately enlarged left atrium.   8. Moderately enlarged right atrium.   9. Mild mitral valve regurgitation.  10. Mild tricuspid regurgitation.  11. Mild aortic regurgitation.  12. Estimated pulmonary artery systolic pressure is 46.6 mmHg assuming a   right atrial pressure of 3 mmHg, which is consistent with mild pulmonary   hypertension.                    Care Discussed with Consultants/Other Providers [ x] YES  [ ] NO

## 2022-06-19 NOTE — PROGRESS NOTE ADULT - ASSESSMENT
Impression  ·	Non-ischemic dilated Cardiomyopathy  ·	Hypertensive urgency, resolved   ·	Hypothyroidism  ·	family history of Heart disease  ---------------------------------------------  EKG Sinus tachycardia , LAE, LAFB, non specific T wave changes  ECHO 6/17 EF less than 20%, grade III DD, reduced RV function, mild MR, mild TR, biatrial enlargement   s/p negative Cath    Plan    CNS  no sedation    HEENT  Oral care    PULM  HOB at 43  O2 PRN    CARDIO  - keep K>4 and Mg>2  - cont with coreg 3.125 BID  - LHC - negative for CAD  - Cardiac MR pending  - HF recommendation appreciated  - Bumex 2mg IV BID  - Entresto 49/51 mg BID starting tomorrow       RENAL  Monitor Sr Cr  Correct electrolytes as needed     GI  GI ppx as needed    HEM  DVT ppx    Dispo  SDU     Impression  ·	Non-ischemic dilated Cardiomyopathy  ·	Hypertensive urgency, resolved   ·	Hypothyroidism  ·	family history of Heart disease  ---------------------------------------------  EKG Sinus tachycardia , LAE, LAFB, non specific T wave changes  ECHO 6/17 EF less than 20%, grade III DD, reduced RV function, mild MR, mild TR, biatrial enlargement   s/p negative Cath    Plan    CNS  no sedation    HEENT  Oral care    PULM  HOB at 43  O2 PRN    CARDIO  - keep K>4 and Mg>2  - cont with coreg 3.125 BID  - LHC - negative for CAD  - Cardiac MR pending  - HF recommendation appreciated  - Bumex 2mg IV BID  - Entresto 49/51 mg BID       RENAL  Monitor Sr Cr  Correct electrolytes as needed     GI  GI ppx as needed    HEM  DVT ppx    Dispo  SDU

## 2022-06-20 LAB
ALBUMIN SERPL ELPH-MCNC: 4.4 G/DL — SIGNIFICANT CHANGE UP (ref 3.5–5.2)
ALP SERPL-CCNC: 108 U/L — SIGNIFICANT CHANGE UP (ref 30–115)
ALT FLD-CCNC: 23 U/L — SIGNIFICANT CHANGE UP (ref 0–41)
ANION GAP SERPL CALC-SCNC: 12 MMOL/L — SIGNIFICANT CHANGE UP (ref 7–14)
ANION GAP SERPL CALC-SCNC: 14 MMOL/L — SIGNIFICANT CHANGE UP (ref 7–14)
ANION GAP SERPL CALC-SCNC: 14 MMOL/L — SIGNIFICANT CHANGE UP (ref 7–14)
AST SERPL-CCNC: 32 U/L — SIGNIFICANT CHANGE UP (ref 0–41)
BASOPHILS # BLD AUTO: 0.09 K/UL — SIGNIFICANT CHANGE UP (ref 0–0.2)
BASOPHILS NFR BLD AUTO: 0.7 % — SIGNIFICANT CHANGE UP (ref 0–1)
BILIRUB SERPL-MCNC: 1.6 MG/DL — HIGH (ref 0.2–1.2)
BUN SERPL-MCNC: 14 MG/DL — SIGNIFICANT CHANGE UP (ref 10–20)
BUN SERPL-MCNC: 15 MG/DL — SIGNIFICANT CHANGE UP (ref 10–20)
BUN SERPL-MCNC: 16 MG/DL — SIGNIFICANT CHANGE UP (ref 10–20)
CALCIUM SERPL-MCNC: 8.4 MG/DL — LOW (ref 8.5–10.1)
CALCIUM SERPL-MCNC: 8.5 MG/DL — SIGNIFICANT CHANGE UP (ref 8.5–10.1)
CALCIUM SERPL-MCNC: 8.5 MG/DL — SIGNIFICANT CHANGE UP (ref 8.5–10.1)
CHLORIDE SERPL-SCNC: 101 MMOL/L — SIGNIFICANT CHANGE UP (ref 98–110)
CHLORIDE SERPL-SCNC: 95 MMOL/L — LOW (ref 98–110)
CHLORIDE SERPL-SCNC: 97 MMOL/L — LOW (ref 98–110)
CO2 SERPL-SCNC: 28 MMOL/L — SIGNIFICANT CHANGE UP (ref 17–32)
CO2 SERPL-SCNC: 30 MMOL/L — SIGNIFICANT CHANGE UP (ref 17–32)
CO2 SERPL-SCNC: 31 MMOL/L — SIGNIFICANT CHANGE UP (ref 17–32)
CREAT SERPL-MCNC: 0.9 MG/DL — SIGNIFICANT CHANGE UP (ref 0.7–1.5)
CREAT SERPL-MCNC: 1 MG/DL — SIGNIFICANT CHANGE UP (ref 0.7–1.5)
CREAT SERPL-MCNC: 1 MG/DL — SIGNIFICANT CHANGE UP (ref 0.7–1.5)
EGFR: 101 ML/MIN/1.73M2 — SIGNIFICANT CHANGE UP
EGFR: 101 ML/MIN/1.73M2 — SIGNIFICANT CHANGE UP
EGFR: 115 ML/MIN/1.73M2 — SIGNIFICANT CHANGE UP
EOSINOPHIL # BLD AUTO: 0.2 K/UL — SIGNIFICANT CHANGE UP (ref 0–0.7)
EOSINOPHIL NFR BLD AUTO: 1.6 % — SIGNIFICANT CHANGE UP (ref 0–8)
GLUCOSE SERPL-MCNC: 100 MG/DL — HIGH (ref 70–99)
GLUCOSE SERPL-MCNC: 84 MG/DL — SIGNIFICANT CHANGE UP (ref 70–99)
GLUCOSE SERPL-MCNC: 84 MG/DL — SIGNIFICANT CHANGE UP (ref 70–99)
HCT VFR BLD CALC: 55.4 % — HIGH (ref 42–52)
HGB BLD-MCNC: 17.5 G/DL — SIGNIFICANT CHANGE UP (ref 14–18)
IMM GRANULOCYTES NFR BLD AUTO: 0.4 % — HIGH (ref 0.1–0.3)
LYMPHOCYTES # BLD AUTO: 24.5 % — SIGNIFICANT CHANGE UP (ref 20.5–51.1)
LYMPHOCYTES # BLD AUTO: 3.11 K/UL — SIGNIFICANT CHANGE UP (ref 1.2–3.4)
MAGNESIUM SERPL-MCNC: 1.8 MG/DL — SIGNIFICANT CHANGE UP (ref 1.8–2.4)
MAGNESIUM SERPL-MCNC: 1.8 MG/DL — SIGNIFICANT CHANGE UP (ref 1.8–2.4)
MAGNESIUM SERPL-MCNC: 1.9 MG/DL — SIGNIFICANT CHANGE UP (ref 1.8–2.4)
MCHC RBC-ENTMCNC: 26.1 PG — LOW (ref 27–31)
MCHC RBC-ENTMCNC: 31.6 G/DL — LOW (ref 32–37)
MCV RBC AUTO: 82.7 FL — SIGNIFICANT CHANGE UP (ref 80–94)
MONOCYTES # BLD AUTO: 0.81 K/UL — HIGH (ref 0.1–0.6)
MONOCYTES NFR BLD AUTO: 6.4 % — SIGNIFICANT CHANGE UP (ref 1.7–9.3)
NEUTROPHILS # BLD AUTO: 8.42 K/UL — HIGH (ref 1.4–6.5)
NEUTROPHILS NFR BLD AUTO: 66.4 % — SIGNIFICANT CHANGE UP (ref 42.2–75.2)
NRBC # BLD: 0 /100 WBCS — SIGNIFICANT CHANGE UP (ref 0–0)
PHOSPHATE SERPL-MCNC: 3.5 MG/DL — SIGNIFICANT CHANGE UP (ref 2.1–4.9)
PLATELET # BLD AUTO: 315 K/UL — SIGNIFICANT CHANGE UP (ref 130–400)
POTASSIUM SERPL-MCNC: 3.1 MMOL/L — LOW (ref 3.5–5)
POTASSIUM SERPL-MCNC: 3.4 MMOL/L — LOW (ref 3.5–5)
POTASSIUM SERPL-MCNC: 3.9 MMOL/L — SIGNIFICANT CHANGE UP (ref 3.5–5)
POTASSIUM SERPL-SCNC: 3.1 MMOL/L — LOW (ref 3.5–5)
POTASSIUM SERPL-SCNC: 3.4 MMOL/L — LOW (ref 3.5–5)
POTASSIUM SERPL-SCNC: 3.9 MMOL/L — SIGNIFICANT CHANGE UP (ref 3.5–5)
PROT SERPL-MCNC: 7.1 G/DL — SIGNIFICANT CHANGE UP (ref 6–8)
RBC # BLD: 6.7 M/UL — HIGH (ref 4.7–6.1)
RBC # FLD: 19.6 % — HIGH (ref 11.5–14.5)
SODIUM SERPL-SCNC: 139 MMOL/L — SIGNIFICANT CHANGE UP (ref 135–146)
SODIUM SERPL-SCNC: 140 MMOL/L — SIGNIFICANT CHANGE UP (ref 135–146)
SODIUM SERPL-SCNC: 143 MMOL/L — SIGNIFICANT CHANGE UP (ref 135–146)
WBC # BLD: 12.68 K/UL — HIGH (ref 4.8–10.8)
WBC # FLD AUTO: 12.68 K/UL — HIGH (ref 4.8–10.8)

## 2022-06-20 PROCEDURE — 99232 SBSQ HOSP IP/OBS MODERATE 35: CPT

## 2022-06-20 PROCEDURE — 71045 X-RAY EXAM CHEST 1 VIEW: CPT | Mod: 26

## 2022-06-20 PROCEDURE — 75561 CARDIAC MRI FOR MORPH W/DYE: CPT | Mod: 26

## 2022-06-20 RX ORDER — CARVEDILOL PHOSPHATE 80 MG/1
6.25 CAPSULE, EXTENDED RELEASE ORAL EVERY 12 HOURS
Refills: 0 | Status: DISCONTINUED | OUTPATIENT
Start: 2022-06-20 | End: 2022-06-23

## 2022-06-20 RX ORDER — POTASSIUM CHLORIDE 20 MEQ
20 PACKET (EA) ORAL
Refills: 0 | Status: COMPLETED | OUTPATIENT
Start: 2022-06-20 | End: 2022-06-20

## 2022-06-20 RX ORDER — SPIRONOLACTONE 25 MG/1
25 TABLET, FILM COATED ORAL DAILY
Refills: 0 | Status: DISCONTINUED | OUTPATIENT
Start: 2022-06-20 | End: 2022-06-23

## 2022-06-20 RX ORDER — POTASSIUM CHLORIDE 20 MEQ
20 PACKET (EA) ORAL
Refills: 0 | Status: DISCONTINUED | OUTPATIENT
Start: 2022-06-20 | End: 2022-06-20

## 2022-06-20 RX ORDER — MAGNESIUM SULFATE 500 MG/ML
2 VIAL (ML) INJECTION ONCE
Refills: 0 | Status: COMPLETED | OUTPATIENT
Start: 2022-06-20 | End: 2022-06-20

## 2022-06-20 RX ORDER — POTASSIUM CHLORIDE 20 MEQ
40 PACKET (EA) ORAL ONCE
Refills: 0 | Status: COMPLETED | OUTPATIENT
Start: 2022-06-20 | End: 2022-06-20

## 2022-06-20 RX ADMIN — Medication 50 MILLIEQUIVALENT(S): at 05:35

## 2022-06-20 RX ADMIN — SACUBITRIL AND VALSARTAN 1 TABLET(S): 24; 26 TABLET, FILM COATED ORAL at 17:51

## 2022-06-20 RX ADMIN — Medication 25 GRAM(S): at 17:49

## 2022-06-20 RX ADMIN — Medication 20 MILLIEQUIVALENT(S): at 09:06

## 2022-06-20 RX ADMIN — Medication 20 MILLIGRAM(S): at 05:34

## 2022-06-20 RX ADMIN — CHLORHEXIDINE GLUCONATE 1 APPLICATION(S): 213 SOLUTION TOPICAL at 05:36

## 2022-06-20 RX ADMIN — Medication 20 MILLIEQUIVALENT(S): at 12:33

## 2022-06-20 RX ADMIN — Medication 25 GRAM(S): at 04:20

## 2022-06-20 RX ADMIN — SACUBITRIL AND VALSARTAN 1 TABLET(S): 24; 26 TABLET, FILM COATED ORAL at 05:34

## 2022-06-20 RX ADMIN — CARVEDILOL PHOSPHATE 3.12 MILLIGRAM(S): 80 CAPSULE, EXTENDED RELEASE ORAL at 05:35

## 2022-06-20 RX ADMIN — Medication 40 MILLIEQUIVALENT(S): at 07:20

## 2022-06-20 RX ADMIN — Medication 20 MILLIEQUIVALENT(S): at 17:50

## 2022-06-20 RX ADMIN — ENOXAPARIN SODIUM 40 MILLIGRAM(S): 100 INJECTION SUBCUTANEOUS at 20:22

## 2022-06-20 RX ADMIN — CARVEDILOL PHOSPHATE 6.25 MILLIGRAM(S): 80 CAPSULE, EXTENDED RELEASE ORAL at 17:50

## 2022-06-20 NOTE — PROGRESS NOTE ADULT - SUBJECTIVE AND OBJECTIVE BOX
Date of Admission: 22    Interval History:  Patient is resting in bed in NAD. Reports ambulating for ~20 mins this AM with no breathing difficulties.     CHIEF COMPLAINT: Patient is a 32y old  Male who presents with a chief complaint of [place]-myocarditis (2022 07:52)    HISTORY OF PRESENT ILLNESS: 32M w/ no PMHx presents after he woke up from sleep w/ palpitations and SOB. Pt reports about a week ago he vomitted and since then he has been having episodes of dry cough, myalgia, and "Constricted" like feeling around chest. He also noticed that past few nights he has been sleeping with head elevation because it felt comfortable. No prior episodes of the same. Currently only c/o fatigue. Denies fever, chills, CP, SOB, palpitations, cough, wheezing, abd pain, nausea, vomiting, diarrhea, constipation, dysuria, or abnormal bleeding.  No Hx of COVID-19 infection. Received total 3 moderna shots. (2022 12:33)    Patient states he has had no previous cardiac history, does not follow with a cardiologist, and has never had any cardiac procedures or testing prior to this admission. Patient reports 6-8 days prior to presentation he was experiencing n/v, dry cough, abdominal bloating, and chest "constriction". The night prior to presentation he was experiencing orthopnea and palpitations, prompting this admission. He stated he has had no issues ambulating until about 2 months ago when he noticed he would get "winded" more easily but attributed this to weight gain (states his weight is always going up and down 2/2 his diet which includes frequent take-out). Patient has only been hospitalized once for kidney stones 10 years ago, and has no other past medical history to report.    PAST MEDICAL & SURGICAL HISTORY:  No pertinent past medical history  No pertinent past surgical history      FAMILY HISTORY:  Mother: Alive and well at 62 y/o  Father:  at 62 y/o from cardiac arrest (also had kidney failure and autoimmune disease)  Grandfather (on mom's side)  of MI      SOCIAL HISTORY:  Denies smoking or drug use. Rare alcohol use.       Allergies  No Known Allergies    Intolerances    	    REVIEW OF SYSTEMS:  CONSTITUTIONAL: No fever, weight loss, or fatigue.  CARDIOLOGY: Patient denies chest pain, shortness of breath or syncopal episodes.   RESPIRATORY: denies shortness of breath, wheezing.   NEUROLOGICAL: No weakness, no focal deficits to report.  GI: no BRBPR, + n/v, no diarrhea.    PSYCHIATRY: normal mood and affect  HEENT: no nasal discharge, no ecchymosis  SKIN: no ecchymosis, no breakdown  MUSCULOSKELETAL: Full range of motion x4.     PHYSICAL EXAM:  General Appearance: obese, normal for age and gender. 	  Neck: normal JVP, no bruit.   Eyes: Extra Ocular muscles intact.   Cardiovascular: regular rate and rhythm S1 S2, No JVD, No murmurs, +1-2 B/L LE pedal edema R>L  Respiratory: Lungs clear to auscultation	  Psychiatry: Alert and oriented x 3, Mood & affect appropriate  Gastrointestinal:  Soft, Non-tender  Skin/Integumen: No rashes, No ecchymoses, No cyanosis	  Neurologic: Non-focal  Musculoskeletal/ extremities: Normal range of motion, No clubbing, cyanosis, +1-2 B/L LE edema R>L  Vascular: Peripheral pulses palpable 2+ bilaterally      CARDIAC MARKERS:  Serum Pro-Brain Natriuretic Peptide: 1827 pg/mL (22 @ 08:00)        TELEMETRY EVENTS: 	    EC22    Ventricular Rate 114 BPM  Atrial Rate 114 BPM  P-R Interval 138 ms  QRS Duration 98 ms  Q-T Interval 352 ms  QTC Calculation(Bazett) 485 ms  P Axis 55 degrees  R Axis -65 degrees  T Axis 88 degrees    Diagnosis Line Sinus tachycardia  Left atrial enlargement  Left anterior fascicular block  Nonspecific T wave abnormality  Abnormal ECG    Confirmed by Oseas Mart ([id number]) on 2022 8:03:25 AM      PREVIOUS DIAGNOSTIC TESTING:    TTE 22    Summary:  1. Left ventricular ejection fraction, by visual estimation, is <20%.   2. Elevated left ventricular end-diastolic pressure.   3. Mildly increased LV wall thickness.   4. Spectral Doppler shows restrictive pattern of left ventricular   myocardial filling (Grade III diastolic dysfunction).   5. Mildly enlarged right ventricle.   6. Severely reduced RV systolic function.   7. Moderately enlarged left atrium.   8. Moderately enlarged right atrium.   9. Mild mitral valve regurgitation.  10. Mild tricuspid regurgitation.  11. Mild aortic regurgitation.  12. Estimated pulmonary artery systolic pressure is 46.6 mmHg assuming a   right atrial pressure of 3 mmHg, which is consistent with mild pulmonary   hypertension.        Cardiac Catheterization: 6/17/22    FINDINGS:     Coronary Dominance: right dominant    LM: normal    LAD: normal  D1: normal  D2: normal    LCX:  normal    RCA: normal        LVEDP: 39 mmHg     EF: 20%     	    Home Medications:    MEDICATIONS  (STANDING):  carvedilol 3.125 milliGRAM(s) Oral every 12 hours  chlorhexidine 4% Liquid 1 Application(s) Topical <User Schedule>  enoxaparin Injectable 40 milliGRAM(s) SubCutaneous every 24 hours  furosemide   Injectable 40 milliGRAM(s) IV Push daily  lisinopril 10 milliGRAM(s) Oral daily  potassium chloride  20 mEq/100 mL IVPB 20 milliEquivalent(s) IV Intermittent every 2 hours    MEDICATIONS  (PRN):         Date of Admission: 22    Interval History:  Patient is resting in bed in NAD. Reports ambulating for ~20 mins this AM with no breathing difficulties. No complaints at this time. 5.7L 24hr UOP.    CHIEF COMPLAINT: Patient is a 32y old  Male who presents with a chief complaint of [place]-myocarditis (2022 07:52)    HISTORY OF PRESENT ILLNESS: 32M w/ no PMHx presents after he woke up from sleep w/ palpitations and SOB. Pt reports about a week ago he vomitted and since then he has been having episodes of dry cough, myalgia, and "Constricted" like feeling around chest. He also noticed that past few nights he has been sleeping with head elevation because it felt comfortable. No prior episodes of the same. Currently only c/o fatigue. Denies fever, chills, CP, SOB, palpitations, cough, wheezing, abd pain, nausea, vomiting, diarrhea, constipation, dysuria, or abnormal bleeding.  No Hx of COVID-19 infection. Received total 3 moderna shots. (2022 12:33)    Patient states he has had no previous cardiac history, does not follow with a cardiologist, and has never had any cardiac procedures or testing prior to this admission. Patient reports 6-8 days prior to presentation he was experiencing n/v, dry cough, abdominal bloating, and chest "constriction". The night prior to presentation he was experiencing orthopnea and palpitations, prompting this admission. He stated he has had no issues ambulating until about 2 months ago when he noticed he would get "winded" more easily but attributed this to weight gain (states his weight is always going up and down 2/2 his diet which includes frequent take-out). Patient has only been hospitalized once for kidney stones 10 years ago, and has no other past medical history to report.    PAST MEDICAL & SURGICAL HISTORY:  No pertinent past medical history  No pertinent past surgical history      FAMILY HISTORY:  Mother: Alive and well at 62 y/o  Father:  at 62 y/o from cardiac arrest (also had kidney failure and autoimmune disease)  Grandfather (on mom's side)  of MI      SOCIAL HISTORY:  Denies smoking or drug use. Rare alcohol use.       Allergies  No Known Allergies    Intolerances      PHYSICAL EXAM:  General Appearance: obese, normal for age and gender. 	  Neck: normal JVP, no bruit.   Eyes: Extra Ocular muscles intact.   Cardiovascular: regular rate and rhythm S1 S2, No JVD, No murmurs, +1 B/L LE pedal edema R>L  Respiratory: Lungs clear to auscultation	  Psychiatry: Alert and oriented x 3, Mood & affect appropriate  Gastrointestinal:  Soft, Non-tender  Skin/Integumen: No rashes, No ecchymoses, No cyanosis	  Neurologic: Non-focal  Musculoskeletal/ extremities: Normal range of motion, No clubbing, cyanosis, +1 B/L LE edema R>L  Vascular: Peripheral pulses palpable 2+ bilaterally      CARDIAC MARKERS:  Serum Pro-Brain Natriuretic Peptide: 1827 pg/mL (22 @ 08:00)        TELEMETRY EVENTS: 	    EC22    Ventricular Rate 114 BPM  Atrial Rate 114 BPM  P-R Interval 138 ms  QRS Duration 98 ms  Q-T Interval 352 ms  QTC Calculation(Bazett) 485 ms  P Axis 55 degrees  R Axis -65 degrees  T Axis 88 degrees    Diagnosis Line Sinus tachycardia  Left atrial enlargement  Left anterior fascicular block  Nonspecific T wave abnormality  Abnormal ECG    Confirmed by Oseas Mart ([id number]) on 2022 8:03:25 AM      PREVIOUS DIAGNOSTIC TESTING:    TTE 22    Summary:  1. Left ventricular ejection fraction, by visual estimation, is <20%.   2. Elevated left ventricular end-diastolic pressure.   3. Mildly increased LV wall thickness.   4. Spectral Doppler shows restrictive pattern of left ventricular   myocardial filling (Grade III diastolic dysfunction).   5. Mildly enlarged right ventricle.   6. Severely reduced RV systolic function.   7. Moderately enlarged left atrium.   8. Moderately enlarged right atrium.   9. Mild mitral valve regurgitation.  10. Mild tricuspid regurgitation.  11. Mild aortic regurgitation.  12. Estimated pulmonary artery systolic pressure is 46.6 mmHg assuming a   right atrial pressure of 3 mmHg, which is consistent with mild pulmonary   hypertension.        Cardiac Catheterization: 22    FINDINGS:     Coronary Dominance: right dominant    LM: normal    LAD: normal  D1: normal  D2: normal    LCX:  normal    RCA: normal        LVEDP: 39 mmHg     EF: 20%     	    Home Medications:    MEDICATIONS  (STANDING):  carvedilol 3.125 milliGRAM(s) Oral every 12 hours  chlorhexidine 4% Liquid 1 Application(s) Topical <User Schedule>  enoxaparin Injectable 40 milliGRAM(s) SubCutaneous every 24 hours  furosemide   Injectable 40 milliGRAM(s) IV Push daily  lisinopril 10 milliGRAM(s) Oral daily  potassium chloride  20 mEq/100 mL IVPB 20 milliEquivalent(s) IV Intermittent every 2 hours    MEDICATIONS  (PRN):

## 2022-06-20 NOTE — PROGRESS NOTE ADULT - SUBJECTIVE AND OBJECTIVE BOX
Patient is a 32y old  Male who presents with a chief complaint of [place]-myocarditis (2022 10:04)    HPI:  32M w/ no PMHx presents after he woke up from sleep w/ palpitations and SOB. Pt reports about a week ago he vomitted and since then he has been having episodes of dry cough, myalgia, and "Constricted" like feeling around chest. He also noticed that past few nights he has been sleeping with head elevation because it felt comfortable. No prior episodes of the same. Currently only c/o fatigue. Denies fever, chills, CP, SOB, palpitations, cough, wheezing, abd pain, nausea, vomiting, diarrhea, constipation, dysuria, or abnormal bleeding.  No Hx of COVID-19 infection. Received total 3 moderna shots.      (2022 12:33)       INTERVAL HPI/OVERNIGHT EVENTS:   No overnight events   Afebrile, hemodynamically stable     Subjective: Patient reports that he is feeling better since admission and did not have any complaints overnight.    ICU Vital Signs Last 24 Hrs  T(C): 36.7 (2022 08:30), Max: 36.7 (2022 20:10)  T(F): 98.1 (2022 08:30), Max: 98.1 (2022 08:30)  HR: 80 (2022 08:30) (71 - 85)  BP: 138/89 (2022 08:30) (122/81 - 138/89)  RR: 18 (po22 08:30) (18 - 18)  SpO2: --    I&O's Summary    2022 07:  -  2022 07:00  --------------------------------------------------------  IN: 400 mL / OUT: 5700 mL / NET: -5300 mL    2022 07:  -  2022 11:46  --------------------------------------------------------  IN: 200 mL / OUT: 325 mL / NET: -125 mL          Daily     Daily Weight in k.4 (2022 04:00)    Adult Advanced Hemodynamics Last 24 Hrs  CVP(mm Hg): --  CVP(cm H2O): --  CO: --  CI: --  PA: --  PA(mean): --  PCWP: --  SVR: --  SVRI: --  PVR: --  PVRI: --    EKG/Telemetry Events:  No Telementry  events    MEDICATIONS  (STANDING):  carvedilol 3.125 milliGRAM(s) Oral every 12 hours  chlorhexidine 4% Liquid 1 Application(s) Topical <User Schedule>  enoxaparin Injectable 40 milliGRAM(s) SubCutaneous every 24 hours  potassium chloride    Tablet ER 20 milliEquivalent(s) Oral every 2 hours  sacubitril 49 mG/valsartan 51 mG 1 Tablet(s) Oral two times a day  torsemide 20 milliGRAM(s) Oral two times a day      PHYSICAL EXAM:  HEAD:  Atraumatic, Normocephalic  EYES: EOMI, PERRLA, conjunctiva and sclera clear  NECK: Supple, No JVD, Normal thyroid, no enlarged nodes  NERVOUS SYSTEM:  Alert & Awake.   CHEST/LUNG: B/L good air entry; No rales, rhonchi, or wheezing  HEART: S1S2 normal, no S3, Regular rate and rhythm; No murmurs  ABDOMEN: Soft, Nontender, Nondistended; Bowel sounds present  EXTREMITIES:  2+ Peripheral Pulses, No clubbing, cyanosis, or edema  LYMPH: No lymphadenopathy noted  SKIN: No rashes or lesions    LABS:                        17.5   12.68 )-----------( 315      ( 2022 05:06 )             55.4     06-20    140  |  95<L>  |  15  ----------------------------<  84  3.4<L>   |  31  |  1.0    Ca    8.5      2022 05:06  Phos  3.5     06-20  Mg     1.9     06-20    TPro  7.1  /  Alb  4.4  /  TBili  1.6<H>  /  DBili  x   /  AST  32  /  ALT  23  /  AlkPhos  108  06-20    LIVER FUNCTIONS - ( 2022 05:06 )  Alb: 4.4 g/dL / Pro: 7.1 g/dL / ALK PHOS: 108 U/L / ALT: 23 U/L / AST: 32 U/L / GGT: x           Echocardio:  Summary:  1. Left ventricular ejection fraction, by visual estimation, is <20%.   2. Elevated left ventricular end-diastolic pressure.   3. Mildly increased LV wall thickness.   4. Spectral Doppler shows restrictive pattern of left ventricular   myocardial filling (Grade III diastolic dysfunction).   5. Mildly enlarged right ventricle.   6. Severely reduced RV systolic function.   7. Moderately enlarged left atrium.   8. Moderately enlarged right atrium.   9. Mild mitral valve regurgitation.  10. Mild tricuspid regurgitation.  11. Mild aortic regurgitation.  12. Estimated pulmonary artery systolic pressure is 46.6 mmHg assuming a   right atrial pressure of 3 mmHg, which is consistent with mild pulmonary   hypertension.        Care Discussed with Consultants/Other Providers [ x] YES  [ ] NO

## 2022-06-20 NOTE — PROGRESS NOTE ADULT - ASSESSMENT
· Assessment	  Impression  ·	Non-ischemic dilated Cardiomyopathy  ·	Hypertensive urgency, resolved   ·	Hypothyroidism  ·	family history of Heart disease  ---------------------------------------------  EKG Sinus tachycardia , LAE, LAFB, non specific T wave changes  ECHO 6/17 EF less than 20%, grade III DD, reduced RV function, mild MR, mild TR, biatrial enlargement   s/p negative Cath    Plan    CNS  no sedation    HEENT  Oral care    PULM  HOB at 45  O2 PRN    CARDIO  - keep K>4 and Mg>2  - cont with coreg 3.125 BID  - LHC - negative for CAD  - Cardiac MR pending  - HF recommendation appreciated  - C/w torsemide 20mg PO bid  - c/w Entresto 49/51 mg BID       RENAL  Monitor Sr Cr  Correct electrolytes as needed     GI  GI ppx as needed    HEM  DVT ppx             · Assessment	  Impression  ·	Non-ischemic dilated Cardiomyopathy  ·	Hypertensive urgency, resolved   ·	Hypothyroidism  ·	family history of Heart disease  ---------------------------------------------  EKG Sinus tachycardia , LAE, LAFB, non specific T wave changes  ECHO 6/17 EF less than 20%, grade III DD, reduced RV function, mild MR, mild TR, biatrial enlargement   Cath 6/17 normal coronary arteries     Plan    CNS  no sedation    HEENT  Oral care    PULM  HOB at 43  O2 PRN    CARDIO  - keep K>4 and Mg>2  - C/w torsemide 20mg PO bid  - c/w Entresto 49/51 mg BID   - Cardiac MR pending  - HF recommendation appreciated        RENAL  Monitor Sr Cr  Correct electrolytes as needed     GI  GI ppx as needed    HEM  DVT ppx        SDU     · Assessment	  Impression  ·	Non-ischemic dilated Cardiomyopathy  ·	Hypertensive urgency, resolved   ·	Hypothyroidism  ·	family history of Heart disease  ---------------------------------------------  EKG Sinus tachycardia , LAE, LAFB, non specific T wave changes  ECHO 6/17 EF less than 20%, grade III DD, reduced RV function, mild MR, mild TR, biatrial enlargement   Cath 6/17 normal coronary arteries     Plan    CNS  no sedation    HEENT  Oral care    PULM  HOB at 43  O2 PRN    CARDIO  - keep K>4 and Mg>2  - c/w Entresto 49/51 mg BID   - Decrease torsemide to 10mg daily (from 20mg BID)  - Increase Coreg to 6.25 BID (from 3.125 BID)  - Start spironolactone 25mg daily   - Farxiga 10mg daily upon discharge  - Get BMP daily   - K 3.4, Mag 1.9- replete   - Maintain potassium >4.0, Mg >2.2  - Strict intake and output  - Daily standing weight   - Proper low Na dietary counseling provided   - Cardiac MR pending  - HF recommendation appreciated        RENAL  Monitor Sr Cr  Correct electrolytes as needed     GI  GI ppx as needed    HEM  DVT ppx    Plan discussed with patient and primary team  Patient to follow up in outpatient heart failure office in 1 week with labs with NP for optimization of GDMT       SDU     · Assessment	  Impression  ·	Non-ischemic dilated Cardiomyopathy  ·	Hypertensive urgency, resolved   ·	Hypothyroidism  ·	family history of Heart disease  ---------------------------------------------  EKG Sinus tachycardia , LAE, LAFB, non specific T wave changes  ECHO 6/17 EF less than 20%, grade III DD, reduced RV function, mild MR, mild TR, biatrial enlargement   Cath 6/17 normal coronary arteries     Plan    CNS  no sedation    HEENT  Oral care    PULM  HOB at 43  O2 PRN    CARDIO  - keep K>4 and Mg>2  - c/w Entresto 49/51 mg BID   - Decrease torsemide to 10mg daily (from 20mg BID)  - Increase Coreg to 6.25 BID (from 3.125 BID)  - Start spironolactone 25mg daily   - Farxiga 10mg daily upon discharge  - Get BMP daily   - K 3.4, Mag 1.9- replete   - Maintain potassium >4.0, Mg >2.2  - Strict intake and output  - Daily standing weight   - Proper low Na dietary counseling provided   - Cardiac MR pending  - HF recommendation appreciated        RENAL  Monitor Sr Cr  Correct electrolytes as needed     GI  GI ppx as needed    HEM  DVT ppx      SDU

## 2022-06-20 NOTE — PROGRESS NOTE ADULT - ASSESSMENT
Assessment:  32M w/ no PMHx presents after he woke up from sleep w/ palpitations and SOB. 1 week prior was also experiencing symptoms of n/v, dry cough, and chest "constriction". WBC 11. Trop 0.09. Pro BNP 1827. CTA chest negative for PE but with R>L pleural effusions. Given IV lasix. Cath 6/17- no CAD. Awaiting cardiac MRI.       Impression  Acute HFrEF- ECHO 6/17 EF less than 20%, grade III DD, reduced RV function, mild MR, mild TR, biatrial enlargement   NICM - LHC 6/17 no CAD  HTN  Obesity     Plan:  Patient is euvolemic on exam: POCUS exam- IVC not dilated, collapsing   Decrease torsemide to 10mg daily (from 20mg BID)  Continue Entresto 49/51 BID   Increase Coreg to 6.25 BID (from 3.125 BID)  Start spironolactone 25mg daily   Farxiga 10mg daily upon discharge  Needs cardiac MRI  Get BMP daily   Maintain potassium >4.0, Mg >2.2  Strict intake and output  Daily standing weight   Proper low Na dietary counseling provided   Plan discussed with patient and primary team  Patient to follow up in outpatient heart failure office in 1 week with labs with NP for optimization of GDMT  Assessment:  32M w/ no PMHx presents after he woke up from sleep w/ palpitations and SOB. 1 week prior was also experiencing symptoms of n/v, dry cough, and chest "constriction". WBC 11. Trop 0.09. Pro BNP 1827. CTA chest negative for PE but with R>L pleural effusions. Given IV lasix. Cath 6/17- no CAD. Awaiting cardiac MRI.       Impression  Acute HFrEF- ECHO 6/17 EF less than 20%, grade III DD, reduced RV function, mild MR, mild TR, biatrial enlargement   NICM - LHC 6/17 no CAD  HTN  Obesity     Plan:  Patient is euvolemic on exam: POCUS exam- IVC not dilated, collapsing   Decrease torsemide to 10mg daily (from 20mg BID)  Continue Entresto 49/51 BID   Increase Coreg to 6.25 BID (from 3.125 BID)  Start spironolactone 25mg daily   Farxiga 10mg daily upon discharge  Needs cardiac MRI  Get BMP daily   K 3.4, Mag 1.9- replete   Maintain potassium >4.0, Mg >2.2  Strict intake and output  Daily standing weight   Proper low Na dietary counseling provided   Plan discussed with patient and primary team  Patient to follow up in outpatient heart failure office in 1 week with labs with NP for optimization of GDMT

## 2022-06-21 PROBLEM — Z78.9 OTHER SPECIFIED HEALTH STATUS: Chronic | Status: ACTIVE | Noted: 2022-06-16

## 2022-06-21 LAB
ALBUMIN SERPL ELPH-MCNC: 3.6 G/DL — SIGNIFICANT CHANGE UP (ref 3.5–5.2)
ALP SERPL-CCNC: 90 U/L — SIGNIFICANT CHANGE UP (ref 30–115)
ALT FLD-CCNC: 22 U/L — SIGNIFICANT CHANGE UP (ref 0–41)
ANION GAP SERPL CALC-SCNC: 12 MMOL/L — SIGNIFICANT CHANGE UP (ref 7–14)
ANION GAP SERPL CALC-SCNC: 14 MMOL/L — SIGNIFICANT CHANGE UP (ref 7–14)
AST SERPL-CCNC: 28 U/L — SIGNIFICANT CHANGE UP (ref 0–41)
BILIRUB SERPL-MCNC: 1.2 MG/DL — SIGNIFICANT CHANGE UP (ref 0.2–1.2)
BUN SERPL-MCNC: 14 MG/DL — SIGNIFICANT CHANGE UP (ref 10–20)
BUN SERPL-MCNC: 14 MG/DL — SIGNIFICANT CHANGE UP (ref 10–20)
CALCIUM SERPL-MCNC: 8.4 MG/DL — LOW (ref 8.5–10.1)
CALCIUM SERPL-MCNC: 9 MG/DL — SIGNIFICANT CHANGE UP (ref 8.5–10.1)
CHLORIDE SERPL-SCNC: 102 MMOL/L — SIGNIFICANT CHANGE UP (ref 98–110)
CHLORIDE SERPL-SCNC: 97 MMOL/L — LOW (ref 98–110)
CO2 SERPL-SCNC: 26 MMOL/L — SIGNIFICANT CHANGE UP (ref 17–32)
CO2 SERPL-SCNC: 29 MMOL/L — SIGNIFICANT CHANGE UP (ref 17–32)
CREAT SERPL-MCNC: 0.9 MG/DL — SIGNIFICANT CHANGE UP (ref 0.7–1.5)
CREAT SERPL-MCNC: 1 MG/DL — SIGNIFICANT CHANGE UP (ref 0.7–1.5)
EGFR: 101 ML/MIN/1.73M2 — SIGNIFICANT CHANGE UP
EGFR: 115 ML/MIN/1.73M2 — SIGNIFICANT CHANGE UP
GLUCOSE SERPL-MCNC: 201 MG/DL — HIGH (ref 70–99)
GLUCOSE SERPL-MCNC: 91 MG/DL — SIGNIFICANT CHANGE UP (ref 70–99)
HCT VFR BLD CALC: 53.2 % — HIGH (ref 42–52)
HGB BLD-MCNC: 16.8 G/DL — SIGNIFICANT CHANGE UP (ref 14–18)
MAGNESIUM SERPL-MCNC: 1.9 MG/DL — SIGNIFICANT CHANGE UP (ref 1.8–2.4)
MAGNESIUM SERPL-MCNC: 2.1 MG/DL — SIGNIFICANT CHANGE UP (ref 1.8–2.4)
MCHC RBC-ENTMCNC: 26.2 PG — LOW (ref 27–31)
MCHC RBC-ENTMCNC: 31.6 G/DL — LOW (ref 32–37)
MCV RBC AUTO: 82.9 FL — SIGNIFICANT CHANGE UP (ref 80–94)
NRBC # BLD: 0 /100 WBCS — SIGNIFICANT CHANGE UP (ref 0–0)
PLATELET # BLD AUTO: 316 K/UL — SIGNIFICANT CHANGE UP (ref 130–400)
POTASSIUM SERPL-MCNC: 3.5 MMOL/L — SIGNIFICANT CHANGE UP (ref 3.5–5)
POTASSIUM SERPL-MCNC: 4 MMOL/L — SIGNIFICANT CHANGE UP (ref 3.5–5)
POTASSIUM SERPL-SCNC: 3.5 MMOL/L — SIGNIFICANT CHANGE UP (ref 3.5–5)
POTASSIUM SERPL-SCNC: 4 MMOL/L — SIGNIFICANT CHANGE UP (ref 3.5–5)
PROT SERPL-MCNC: 5.9 G/DL — LOW (ref 6–8)
RBC # BLD: 6.42 M/UL — HIGH (ref 4.7–6.1)
RBC # FLD: 19.4 % — HIGH (ref 11.5–14.5)
SODIUM SERPL-SCNC: 137 MMOL/L — SIGNIFICANT CHANGE UP (ref 135–146)
SODIUM SERPL-SCNC: 143 MMOL/L — SIGNIFICANT CHANGE UP (ref 135–146)
WBC # BLD: 12.74 K/UL — HIGH (ref 4.8–10.8)
WBC # FLD AUTO: 12.74 K/UL — HIGH (ref 4.8–10.8)

## 2022-06-21 PROCEDURE — 99255 IP/OBS CONSLTJ NEW/EST HI 80: CPT

## 2022-06-21 PROCEDURE — 99232 SBSQ HOSP IP/OBS MODERATE 35: CPT

## 2022-06-21 RX ORDER — MAGNESIUM SULFATE 500 MG/ML
2 VIAL (ML) INJECTION ONCE
Refills: 0 | Status: COMPLETED | OUTPATIENT
Start: 2022-06-21 | End: 2022-06-21

## 2022-06-21 RX ORDER — POTASSIUM CHLORIDE 20 MEQ
20 PACKET (EA) ORAL ONCE
Refills: 0 | Status: COMPLETED | OUTPATIENT
Start: 2022-06-21 | End: 2022-06-21

## 2022-06-21 RX ORDER — POTASSIUM CHLORIDE 20 MEQ
20 PACKET (EA) ORAL
Refills: 0 | Status: DISCONTINUED | OUTPATIENT
Start: 2022-06-21 | End: 2022-06-23

## 2022-06-21 RX ADMIN — SACUBITRIL AND VALSARTAN 1 TABLET(S): 24; 26 TABLET, FILM COATED ORAL at 05:54

## 2022-06-21 RX ADMIN — Medication 25 GRAM(S): at 13:15

## 2022-06-21 RX ADMIN — CARVEDILOL PHOSPHATE 6.25 MILLIGRAM(S): 80 CAPSULE, EXTENDED RELEASE ORAL at 05:53

## 2022-06-21 RX ADMIN — Medication 20 MILLIEQUIVALENT(S): at 17:30

## 2022-06-21 RX ADMIN — SPIRONOLACTONE 25 MILLIGRAM(S): 25 TABLET, FILM COATED ORAL at 05:54

## 2022-06-21 RX ADMIN — Medication 20 MILLIEQUIVALENT(S): at 11:17

## 2022-06-21 RX ADMIN — CARVEDILOL PHOSPHATE 6.25 MILLIGRAM(S): 80 CAPSULE, EXTENDED RELEASE ORAL at 17:30

## 2022-06-21 RX ADMIN — ENOXAPARIN SODIUM 40 MILLIGRAM(S): 100 INJECTION SUBCUTANEOUS at 23:03

## 2022-06-21 RX ADMIN — Medication 10 MILLIGRAM(S): at 05:54

## 2022-06-21 RX ADMIN — Medication 20 MILLIEQUIVALENT(S): at 13:16

## 2022-06-21 RX ADMIN — CHLORHEXIDINE GLUCONATE 1 APPLICATION(S): 213 SOLUTION TOPICAL at 05:54

## 2022-06-21 RX ADMIN — SACUBITRIL AND VALSARTAN 1 TABLET(S): 24; 26 TABLET, FILM COATED ORAL at 17:30

## 2022-06-21 NOTE — CONSULT NOTE ADULT - NS ATTEND AMEND GEN_ALL_CORE FT
GDMT  PET scan  HF and Cardio f-yp  Lifevest for now  If suspicion of sarcoidosis high, will be a candidate for ICD  OP f-up in 2 months

## 2022-06-21 NOTE — CONSULT NOTE ADULT - SUBJECTIVE AND OBJECTIVE BOX
Patient is a 32y old  Male who presents with a chief complaint of [place]-myocarditis (2022 08:00)    HPI: Patient is a 32M w/ no PMHx presents after he woke up from sleep w/ palpitations and SOB. Pt reports about a week ago he vomited and since then he has been having episodes of dry cough, myalgia, and "Constricted" like feeling around chest. He also noticed that past few nights he has been sleeping with head elevation because it felt comfortable. No prior episodes of the same. Currently only c/o fatigue. Denies fever, chills, CP, SOB, palpitations, cough, wheezing, abd pain, nausea, vomiting, diarrhea, constipation, dysuria, or abnormal bleeding.  No Hx of COVID-19 infection. Received total 3 moderna shots. Patient states he has had no previous cardiac history, does not follow with a cardiologist, and has never had any cardiac procedures or testing prior to this admission. Patient reports 6-8 days prior to presentation he was experiencing n/v, dry cough, abdominal bloating, and chest "constriction". The night prior to presentation he was experiencing orthopnea and palpitations, prompting this admission. He stated he has had no issues ambulating until about 2 months ago when he noticed he would get "winded" more easily but attributed this to weight gain (states his weight is always going up and down 2/2 his diet which includes frequent take-out). Patient has only been hospitalized once for kidney stones 10 years ago, and has no other past medical history to report. States both his maternal grandmother and great-uncle had AICD (unsure why). Denies dizziness, syncope.     PAST MEDICAL & SURGICAL HISTORY:  No pertinent past medical history      No pertinent past surgical history    PREVIOUS DIAGNOSTIC TESTING:      ECHO  FINDINGS:  < from: TTE Echo Complete w/o Contrast w/ Doppler (22 @ 15:25) >  Summary:  1. Left ventricular ejection fraction, by visual estimation, is <20%.   2. Elevated left ventricular end-diastolic pressure.   3. Mildly increased LV wall thickness.   4. Spectral Doppler shows restrictive pattern of left ventricular   myocardial filling (Grade III diastolic dysfunction).   5. Mildly enlarged right ventricle.   6. Severely reduced RV systolic function.   7. Moderately enlarged left atrium.   8. Moderately enlarged right atrium.   9. Mild mitral valve regurgitation.  10. Mild tricuspid regurgitation.  11. Mild aortic regurgitation.  12. Estimated pulmonary artery systolic pressure is 46.6 mmHg assuming a   right atrial pressure of 3 mmHg, which is consistent with mild pulmonary   hypertension.    < end of copied text >    STRESS  FINDINGS:    CATHETERIZATION  FINDINGS:  POST-OP DIAGNOSIS:      [x] Normal Coronary Angiogram     [] Mild Coronary Artery Disease (< 50% stenosis)     [] __ Vessel Coronary Artery Disease     ELECTROPHYSIOLOGY STUDY  FINDINGS:    CAROTID ULTRASOUND:  FINDINGS    VENOUS DUPLEX SCAN:  FINDINGS:    CHEST CT PULMONARY ANGIO with IV Contrast:  FINDINGS:  < from: MR Cardiac w/wo IV Cont (22 @ 15:38) >  IMPRESSION:    1. Concentric left ventricular hypertrophy, severe global hypokinesis   with additional findings of mid myocardial lategadolinium enhancement   within the lateral wall and mid inferoseptum. Findings may be related to   concentric hypertrophic cardiomyopathy versus infiltrative disease   (sarcoid). PET/CT may be obtained for further evaluation.    2. Mild right ventricular hypertrophy with severely depressed function.    < end of copied text >    MEDICATIONS  (STANDING):  carvedilol 6.25 milliGRAM(s) Oral every 12 hours  chlorhexidine 4% Liquid 1 Application(s) Topical <User Schedule>  enoxaparin Injectable 40 milliGRAM(s) SubCutaneous every 24 hours  potassium chloride    Tablet ER 20 milliEquivalent(s) Oral every 2 hours  sacubitril 49 mG/valsartan 51 mG 1 Tablet(s) Oral two times a day  spironolactone 25 milliGRAM(s) Oral daily  torsemide 10 milliGRAM(s) Oral daily    MEDICATIONS  (PRN):      FAMILY HISTORY:  FH: heart attack (Father)   of MI at age 63    SOCIAL HISTORY: No smoking, ETOH or illicit drug use    Past Surgical History: None    Allergies:  No Known Allergies      REVIEW OF SYSTEMS:  CONSTITUTIONAL: No fever, weight loss, chills, shakes, or fatigue  RESPIRATORY: No cough, wheezing, hemoptysis, or shortness of breath  CARDIOVASCULAR: + chest pain, dyspnea, palpitations, No dizziness, syncope, paroxysmal nocturnal dyspnea, orthopnea, or arm or leg swelling  GASTROINTESTINAL: No abdominal  or epigastric pain, nausea, vomiting, hematemesis, diarrhea, constipation, melena or bright red blood.  NEUROLOGICAL: No headaches, memory loss, slurred speech, limb weakness, loss of strength, numbness, or tremors  MUSCULOSKELETAL: No joint pain or swelling, muscle, back, or extremity pain      Vital Signs Last 24 Hrs  T(C): 36.7 (2022 13:00), Max: 36.7 (2022 13:00)  T(F): 98 (2022 13:00), Max: 98 (2022 13:00)  HR: 86 (2022 13:00) (71 - 86)  BP: 126/84 (2022 13:00) (104/59 - 133/82)  BP(mean): --  RR: 18 (2022 04:20) (18 - 18)  SpO2: 98% (2022 13:00) (98% - 98%)    PHYSICAL EXAM:  GENERAL: In no apparent distress, well nourished, and hydrated.  NECK: Supple, No JVD   HEART: Regular rate and rhythm; No murmurs, rubs, or gallops.  PULMONARY: Clear to auscultation and perfusion.  No rales, wheezing, or rhonchi bilaterally.  EXTREMITIES:  2+ Peripheral Pulses, no LE edema BL  NEUROLOGICAL: Grossly nonfocal      INTERPRETATION OF TELEMETRY: NSR 75 bpm    ECG:  < from: 12 Lead ECG (22 @ 06:26) >    Ventricular Rate 114 BPM    Atrial Rate 114 BPM    P-R Interval 138 ms    QRS Duration 98 ms    Q-T Interval 352 ms    QTC Calculation(Bazett) 485 ms    P Axis 55 degrees    R Axis -65 degrees    T Axis 88 degrees    Diagnosis Line Sinus tachycardia  Left atrial enlargement  Left anterior fascicular block  Nonspecific T wave abnormality  Abnormal ECG    Confirmed by Oseas Mart ([id number]) on 2022 8:03:25 AM    < end of copied text >      I&O's Detail    2022 07:01  -  2022 07:00  --------------------------------------------------------  IN:    Oral Fluid: 200 mL  Total IN: 200 mL    OUT:    Voided (mL): 1375 mL  Total OUT: 1375 mL    Total NET: -1175 mL      2022 07:01  -  2022 14:12  --------------------------------------------------------  IN:    Oral Fluid: 420 mL  Total IN: 420 mL    OUT:    Voided (mL): 1000 mL  Total OUT: 1000 mL    Total NET: -580 mL          LABS:                        16.8   12.74 )-----------( 316      ( 2022 06:06 )             53.2     06-21    137  |  97<L>  |  14  ----------------------------<  201<H>  3.5   |  26  |  0.9    Ca    8.4<L>      2022 06:06  Phos  3.5     06-20  Mg     1.9         TPro  5.9<L>  /  Alb  3.6  /  TBili  1.2  /  DBili  x   /  AST  28  /  ALT  22  /  AlkPhos  90              BNP  I&O's Detail    2022 07:  -  2022 07:00  --------------------------------------------------------  IN:    Oral Fluid: 200 mL  Total IN: 200 mL    OUT:    Voided (mL): 1375 mL  Total OUT: 1375 mL    Total NET: -1175 mL      2022 07:01  -  2022 14:12  --------------------------------------------------------  IN:    Oral Fluid: 420 mL  Total IN: 420 mL    OUT:    Voided (mL): 1000 mL  Total OUT: 1000 mL    Total NET: -580 mL        Daily     Daily Weight in k.4 (2022 04:20)    RADIOLOGY & ADDITIONAL STUDIES:

## 2022-06-21 NOTE — CONSULT NOTE ADULT - ASSESSMENT
Assessment: 31 yo M with no PMHx admitted with chest tightness, SOB and palpitations. Found to have NICM EF < 20% and (+) infiltrative disease on CMRI. No syncope    Impression:  NICM EF < 20%  +Infiltrative Disease on CMRI  Leukocytosis    Plan:  - Recommend AICD implant for primary prevention  - NPO after midnight for possible procedure tomorrow, add on case  - Repeat COVID swab  - Check UA, Blood Cx to r/o infectious cause Assessment: 31 yo M with no PMHx admitted with chest tightness, SOB and palpitations. Found to have NICM EF < 20% and (+) infiltrative disease on CMRI. No syncope    Impression:  NICM EF < 20%  +Infiltrative Disease on CMRI  Leukocytosis    Plan:  - Recommend AICD implant for primary prevention  - NPO after midnight for possible procedure tomorrow, add on case  - Repeat COVID swab  - Check UA, Blood Cx to r/o infection given leukocytosis  - PT/PTT  - Hold anticoagulation tonight and tomorrow AM  - Monitor electrolytes, maintain WNL  - Cont GDMT  - Will follow Assessment: 31 yo M with no PMHx admitted with chest tightness, SOB and palpitations. Found to have NICM EF < 20% and suspicion for infiltrative disease on CMRI. No syncope    Impression:  NICM EF < 20%  Suspicion for Infiltrative Disease on CMRI  Leukocytosis    Plan:  - Recommend Lifevest for primary prevention, will contact [employer] rep  - Cont tele monitoring  - Cont GDMT  - Monitor electrolytes, maintain WNL  - Patient can follow up with Dr Higgins in 2-3 months

## 2022-06-21 NOTE — PROGRESS NOTE ADULT - SUBJECTIVE AND OBJECTIVE BOX
Patient is a 32y old  Male who presents with a chief complaint of [place]-myocarditis (2022 11:45)    HPI:  32M w/ no PMHx presents after he woke up from sleep w/ palpitations and SOB. Pt reports about a week ago he vomitted and since then he has been having episodes of dry cough, myalgia, and "Constricted" like feeling around chest. He also noticed that past few nights he has been sleeping with head elevation because it felt comfortable. No prior episodes of the same. Currently only c/o fatigue. Denies fever, chills, CP, SOB, palpitations, cough, wheezing, abd pain, nausea, vomiting, diarrhea, constipation, dysuria, or abnormal bleeding.  No Hx of COVID-19 infection. Received total 3 moderna shots.      (2022 12:33)       INTERVAL HPI/OVERNIGHT EVENTS:   No overnight events   Afebrile, hemodynamically stable     Subjective:    ICU Vital Signs Last 24 Hrs  T(C): 36.3 (2022 04:20), Max: 36.7 (2022 08:30)  T(F): 97.3 (2022 04:20), Max: 98.1 (2022 08:30)  HR: 71 (2022 04:20) (71 - 83)  BP: 113/69 (2022 04:20) (113/69 - 138/89)  BP(mean): --  ABP: --  ABP(mean): --  RR: 18 (2022 04:20) (18 - 18)  SpO2: --    I&O's Summary    2022 07:01  -  2022 07:00  --------------------------------------------------------  IN: 200 mL / OUT: 1375 mL / NET: -1175 mL          Daily     Daily Weight in k.4 (2022 04:20)    Adult Advanced Hemodynamics Last 24 Hrs  CVP(mm Hg): --  CVP(cm H2O): --  CO: --  CI: --  PA: --  PA(mean): --  PCWP: --  SVR: --  SVRI: --  PVR: --  PVRI: --    EKG/Telemetry Events:    MEDICATIONS  (STANDING):  carvedilol 6.25 milliGRAM(s) Oral every 12 hours  chlorhexidine 4% Liquid 1 Application(s) Topical <User Schedule>  enoxaparin Injectable 40 milliGRAM(s) SubCutaneous every 24 hours  sacubitril 49 mG/valsartan 51 mG 1 Tablet(s) Oral two times a day  spironolactone 25 milliGRAM(s) Oral daily  torsemide 10 milliGRAM(s) Oral daily    MEDICATIONS  (PRN):      PHYSICAL EXAM:  GENERAL:   HEAD:  Atraumatic, Normocephalic  EYES: EOMI, PERRLA, conjunctiva and sclera clear  NECK: Supple, No JVD, Normal thyroid, no enlarged nodes  NERVOUS SYSTEM:  Alert & Awake.   CHEST/LUNG: B/L good air entry; No rales, rhonchi, or wheezing  HEART: S1S2 normal, no S3, Regular rate and rhythm; No murmurs  ABDOMEN: Soft, Nontender, Nondistended; Bowel sounds present  EXTREMITIES:  2+ Peripheral Pulses, No clubbing, cyanosis, or edema  LYMPH: No lymphadenopathy noted  SKIN: No rashes or lesions    LABS:                        16.8   12.74 )-----------( 316      ( 2022 06:06 )             53.2     06-20    143  |  101  |  14  ----------------------------<  84  3.9   |  30  |  1.0    Ca    8.5      2022 16:49  Phos  3.5     06-20  Mg     1.8     06-20    TPro  7.1  /  Alb  4.4  /  TBili  1.6<H>  /  DBili  x   /  AST  32  /  ALT  23  /  AlkPhos  108  06-20    LIVER FUNCTIONS - ( 2022 05:06 )  Alb: 4.4 g/dL / Pro: 7.1 g/dL / ALK PHOS: 108 U/L / ALT: 23 U/L / AST: 32 U/L / GGT: x             CAPILLARY BLOOD GLUCOSE                      RADIOLOGY & ADDITIONAL TESTS:  CXR:        Care Discussed with Consultants/Other Providers [ x] YES  [ ] NO

## 2022-06-21 NOTE — PROGRESS NOTE ADULT - ASSESSMENT
·	Non-ischemic dilated Cardiomyopathy  ·	Hypertensive urgency, resolved   ·	Hypothyroidism  ·	family history of Heart disease  ---------------------------------------------  EKG Sinus tachycardia , LAE, LAFB, non specific T wave changes  ECHO 6/17 EF less than 20%, grade III DD, reduced RV function, mild MR, mild TR, biatrial enlargement   Cath 6/17 normal coronary arteries     Plan    CNS  no sedation    HEENT  Oral care    PULM  HOB at 45  O2 PRN    CARDIO  - keep K>4 and Mg>2  - c/w Entresto 49/51 mg BID   - Decrease torsemide to 10mg daily (from 20mg BID)  - Increase Coreg to 6.25 BID (from 3.125 BID)  - Start spironolactone 25mg daily   - Farxiga 10mg daily upon discharge  - Get BMP daily   - Maintain potassium >4.0, Mg >2.2  - Proper low Na dietary counseling provided   - Cardiac MR pending official read  - HF recommendation appreciated        RENAL  Monitor Sr Cr  Correct electrolytes as needed     GI  GI ppx as needed    HEM  DVT ppx      SDU   Impression   ·	Non-ischemic dilated Cardiomyopathy  ·	Hypertensive urgency, resolved   ·	Hypothyroidism  ·	family history of Heart disease  ---------------------------------------------  EKG Sinus tachycardia , LAE, LAFB, non specific T wave changes  ECHO 6/17 EF less than 20%, grade III DD, reduced RV function, mild MR, mild TR, biatrial enlargement   Cath 6/17 normal coronary arteries     Plan    CNS  no sedation    HEENT  Oral care    PULM  HOB at 43  O2 PRN    CARDIO  - keep K>4 and Mg>2  - c/w Entresto 49/51 mg BID   - Decrease torsemide to 10mg daily (from 20mg BID)  - Increase Coreg to 6.25 BID (from 3.125 BID)  - Start spironolactone 25mg daily   - Farxiga 10mg daily upon discharge  - Get BMP daily   - Maintain potassium >4.0, Mg >2.2  - Proper low Na dietary counseling provided   - Cardiac MR pending official read  - HF recommendation appreciated        RENAL  Monitor Sr Cr  Correct electrolytes as needed     GI  GI ppx as needed    HEM  DVT ppx      SDU   Impression   ·	Non-ischemic dilated Cardiomyopathy  ·	Hypertensive urgency, resolved   ·	Hypothyroidism  ·	family history of Heart disease  ---------------------------------------------  EKG Sinus tachycardia , LAE, LAFB, non specific T wave changes  ECHO 6/17 EF less than 20%, grade III DD, reduced RV function, mild MR, mild TR, biatrial enlargement   Cath 6/17 normal coronary arteries     Plan    CNS  no sedation    HEENT  Oral care    PULM  HOB at 43  O2 PRN    CARDIO  - keep K>4 and Mg>2  - c/w Entresto 49/51 mg BID   - C/W torsemide 10mg daily (from 20mg BID)  - C/W Coreg 6.25 BID (from 3.125 BID)  - Start spironolactone 25mg daily   - Farxiga 10mg daily upon discharge  - Get BMP daily   - Maintain potassium >4.0, Mg >2.2  - Proper low Na dietary counseling provided   - Cardiac MR as noted above   - Patient does have a LGE, but he does not have any other signs and symptoms suggestive of sarcoidosis so the plan right now is to have PET scan done as an outpatient for definitive diagnosis and the patient will have a life vest in the interim for prevention against SCD  - HF recommendation appreciated        RENAL  Monitor Sr Cr  Correct electrolytes as needed     GI  GI ppx as needed    HEM  DVT ppx      SDU

## 2022-06-22 ENCOUNTER — TRANSCRIPTION ENCOUNTER (OUTPATIENT)
Age: 32
End: 2022-06-22

## 2022-06-22 LAB
ALBUMIN SERPL ELPH-MCNC: 3.7 G/DL — SIGNIFICANT CHANGE UP (ref 3.5–5.2)
ALP SERPL-CCNC: 92 U/L — SIGNIFICANT CHANGE UP (ref 30–115)
ALT FLD-CCNC: 24 U/L — SIGNIFICANT CHANGE UP (ref 0–41)
ANION GAP SERPL CALC-SCNC: 10 MMOL/L — SIGNIFICANT CHANGE UP (ref 7–14)
ANION GAP SERPL CALC-SCNC: 14 MMOL/L — SIGNIFICANT CHANGE UP (ref 7–14)
AST SERPL-CCNC: 48 U/L — HIGH (ref 0–41)
BILIRUB SERPL-MCNC: 1.2 MG/DL — SIGNIFICANT CHANGE UP (ref 0.2–1.2)
BUN SERPL-MCNC: 12 MG/DL — SIGNIFICANT CHANGE UP (ref 10–20)
BUN SERPL-MCNC: 13 MG/DL — SIGNIFICANT CHANGE UP (ref 10–20)
CALCIUM SERPL-MCNC: 8.7 MG/DL — SIGNIFICANT CHANGE UP (ref 8.5–10.1)
CALCIUM SERPL-MCNC: 9.3 MG/DL — SIGNIFICANT CHANGE UP (ref 8.5–10.1)
CHLORIDE SERPL-SCNC: 103 MMOL/L — SIGNIFICANT CHANGE UP (ref 98–110)
CHLORIDE SERPL-SCNC: 104 MMOL/L — SIGNIFICANT CHANGE UP (ref 98–110)
CO2 SERPL-SCNC: 25 MMOL/L — SIGNIFICANT CHANGE UP (ref 17–32)
CO2 SERPL-SCNC: 28 MMOL/L — SIGNIFICANT CHANGE UP (ref 17–32)
CREAT SERPL-MCNC: 0.9 MG/DL — SIGNIFICANT CHANGE UP (ref 0.7–1.5)
CREAT SERPL-MCNC: 0.9 MG/DL — SIGNIFICANT CHANGE UP (ref 0.7–1.5)
EGFR: 115 ML/MIN/1.73M2 — SIGNIFICANT CHANGE UP
EGFR: 115 ML/MIN/1.73M2 — SIGNIFICANT CHANGE UP
GLUCOSE SERPL-MCNC: 80 MG/DL — SIGNIFICANT CHANGE UP (ref 70–99)
GLUCOSE SERPL-MCNC: 86 MG/DL — SIGNIFICANT CHANGE UP (ref 70–99)
HCT VFR BLD CALC: 55.6 % — HIGH (ref 42–52)
HGB BLD-MCNC: 17.1 G/DL — SIGNIFICANT CHANGE UP (ref 14–18)
MAGNESIUM SERPL-MCNC: 1.7 MG/DL — LOW (ref 1.8–2.4)
MAGNESIUM SERPL-MCNC: 1.9 MG/DL — SIGNIFICANT CHANGE UP (ref 1.8–2.4)
MCHC RBC-ENTMCNC: 25.9 PG — LOW (ref 27–31)
MCHC RBC-ENTMCNC: 30.8 G/DL — LOW (ref 32–37)
MCV RBC AUTO: 84.4 FL — SIGNIFICANT CHANGE UP (ref 80–94)
NRBC # BLD: 0 /100 WBCS — SIGNIFICANT CHANGE UP (ref 0–0)
PLATELET # BLD AUTO: 304 K/UL — SIGNIFICANT CHANGE UP (ref 130–400)
POTASSIUM SERPL-MCNC: 4.1 MMOL/L — SIGNIFICANT CHANGE UP (ref 3.5–5)
POTASSIUM SERPL-MCNC: 4.2 MMOL/L — SIGNIFICANT CHANGE UP (ref 3.5–5)
POTASSIUM SERPL-SCNC: 4.1 MMOL/L — SIGNIFICANT CHANGE UP (ref 3.5–5)
POTASSIUM SERPL-SCNC: 4.2 MMOL/L — SIGNIFICANT CHANGE UP (ref 3.5–5)
PROT SERPL-MCNC: 6.5 G/DL — SIGNIFICANT CHANGE UP (ref 6–8)
RBC # BLD: 6.59 M/UL — HIGH (ref 4.7–6.1)
RBC # FLD: 19.6 % — HIGH (ref 11.5–14.5)
SODIUM SERPL-SCNC: 142 MMOL/L — SIGNIFICANT CHANGE UP (ref 135–146)
SODIUM SERPL-SCNC: 142 MMOL/L — SIGNIFICANT CHANGE UP (ref 135–146)
WBC # BLD: 12.04 K/UL — HIGH (ref 4.8–10.8)
WBC # FLD AUTO: 12.04 K/UL — HIGH (ref 4.8–10.8)

## 2022-06-22 PROCEDURE — 99232 SBSQ HOSP IP/OBS MODERATE 35: CPT

## 2022-06-22 RX ORDER — CARVEDILOL PHOSPHATE 80 MG/1
1 CAPSULE, EXTENDED RELEASE ORAL
Qty: 60 | Refills: 0
Start: 2022-06-22 | End: 2022-07-21

## 2022-06-22 RX ORDER — SPIRONOLACTONE 25 MG/1
1 TABLET, FILM COATED ORAL
Qty: 30 | Refills: 0
Start: 2022-06-22 | End: 2022-07-21

## 2022-06-22 RX ORDER — DAPAGLIFLOZIN 10 MG/1
1 TABLET, FILM COATED ORAL
Qty: 30 | Refills: 0
Start: 2022-06-22 | End: 2022-07-21

## 2022-06-22 RX ORDER — DAPAGLIFLOZIN 10 MG/1
10 TABLET, FILM COATED ORAL EVERY 24 HOURS
Refills: 0 | Status: DISCONTINUED | OUTPATIENT
Start: 2022-06-22 | End: 2022-06-22

## 2022-06-22 RX ORDER — SACUBITRIL AND VALSARTAN 24; 26 MG/1; MG/1
1 TABLET, FILM COATED ORAL
Qty: 60 | Refills: 0
Start: 2022-06-22 | End: 2022-07-21

## 2022-06-22 RX ADMIN — Medication 10 MILLIGRAM(S): at 06:09

## 2022-06-22 RX ADMIN — CARVEDILOL PHOSPHATE 6.25 MILLIGRAM(S): 80 CAPSULE, EXTENDED RELEASE ORAL at 17:50

## 2022-06-22 RX ADMIN — ENOXAPARIN SODIUM 40 MILLIGRAM(S): 100 INJECTION SUBCUTANEOUS at 20:30

## 2022-06-22 RX ADMIN — SACUBITRIL AND VALSARTAN 1 TABLET(S): 24; 26 TABLET, FILM COATED ORAL at 17:50

## 2022-06-22 RX ADMIN — CARVEDILOL PHOSPHATE 6.25 MILLIGRAM(S): 80 CAPSULE, EXTENDED RELEASE ORAL at 06:10

## 2022-06-22 RX ADMIN — SACUBITRIL AND VALSARTAN 1 TABLET(S): 24; 26 TABLET, FILM COATED ORAL at 06:09

## 2022-06-22 RX ADMIN — SPIRONOLACTONE 25 MILLIGRAM(S): 25 TABLET, FILM COATED ORAL at 06:10

## 2022-06-22 NOTE — DISCHARGE NOTE PROVIDER - CARE PROVIDERS DIRECT ADDRESSES
,[email or web address],DirectAddress_Unknown

## 2022-06-22 NOTE — DISCHARGE NOTE PROVIDER - CARE PROVIDER_API CALL
Nayda Blunt (MD; MD)  Adv Heart Fail Trnsplnt Cardio; Cardiovascular Disease; Internal Medicine  [address] [place] [address]  Phone: [phone]  Fax: [phone]  Follow Up Time: 2 weeks   Nadya Blunt (MD; MD)  Adv Heart Fail Trnsplnt Cardio; Cardiovascular Disease; Internal Medicine  [address] [place] [address], [address] [place] [address]  Phone: [phone]  Fax: [phone]  Follow Up Time: 2 weeks    Theresa Higgins)  Cardiac Electrophysiology; Cardiology; Internal Medicine  [address] [place] [address]  Phone: [phone]  Fax: [phone]  Scheduled Appointment: 09/27/2022

## 2022-06-22 NOTE — DISCHARGE NOTE PROVIDER - NSDCCPCAREPLAN_GEN_ALL_CORE_FT
PRINCIPAL DISCHARGE DIAGNOSIS  Diagnosis: Dilated cardiomyopathy  Assessment and Plan of Treatment: You have been diagnosed with non-ischemic dilated cardiomyopathy. Based on your echocardiogram, you have enlargement of both atrial chambers and based on your cardiac catheterization you have normal, non-occluded coronary arteries. Cardiac electrophysiology has recommended that you undergo a PET scan as an outpatient to further evaluate this condition. In the meantime, you will be discharged with a wearable ICD monitor in the form of a Life Vest. If the PET scan reveals sarcoidosis to be the cause of your condition, you will be considered for ICD placement. If the PET scan comes back negative (normal), you will undergo goal-directed medical therapy for heart failure. You are recommended to follow-up with cardiology in 2 weeks with Dr. Zuniga. You will also need to follow-up with Dr. Higgins for electrophysiology on 9/1/22, and follow-up with your PCP.

## 2022-06-22 NOTE — DISCHARGE NOTE PROVIDER - NSDCFUADDAPPT_GEN_ALL_CORE_FT
APPTS ARE READY TO BE MADE: [ X] YES    Best Family or Patient Contact (if needed):    Additional Information about above appointments (if needed):    1: Dr. Efrain Randolph  2:   3:     Other comments or requests:    APPTS ARE READY TO BE MADE: [ X] YES    Best Family or Patient Contact (if needed):    Additional Information about above appointments (if needed): within 3 weeks of discharge    1: Dr. Efrain Randolph  2:   3:     Other comments or requests:    APPTS ARE READY TO BE MADE: [ X] YES    Best Family or Patient Contact (if needed):    Additional Information about above appointments (if needed): within 3 weeks of discharge    1: Dr. Efrain Randolph  2: Patient will need cardiac PET- CT to rule out cardiac sarcoidosis prior to outpatient appointment with Dr. Higgins.       Other comments or requests:    APPTS ARE READY TO BE MADE: [ X] YES    Best Family or Patient Contact (if needed):    Additional Information about above appointments (if needed): within 3 weeks of discharge    1: Dr. Efrain Randolph  2: Patient will need cardiac PET- CT to rule out cardiac sarcoidosis prior to outpatient appointment with Dr. Higgins.       Other comments or requests:   Patient was provided with follow up request details and was advised to call to schedule follow up within specified time frame.

## 2022-06-22 NOTE — DISCHARGE NOTE PROVIDER - HOSPITAL COURSE
Patient is a 32M w/ no PMHx presents after he woke up from sleep w/ palpitations and SOB. Pt reports about a week ago he vomited and since then he has been having episodes of dry cough, myalgia, and "Constricted" like feeling around chest. He also noticed that past few nights he has been sleeping with head elevation because it felt comfortable. No prior episodes of the same. Currently only c/o fatigue. Denies fever, chills, CP, SOB, palpitations, cough, wheezing, abd pain, nausea, vomiting, diarrhea, constipation, dysuria, or abnormal bleeding.  No Hx of COVID-19 infection. Received total 3 moderna shots. Patient states he has had no previous cardiac history, does not follow with a cardiologist, and has never had any cardiac procedures or testing prior to this admission. Patient reports 6-8 days prior to presentation he was experiencing n/v, dry cough, abdominal bloating, and chest "constriction". The night prior to presentation he was experiencing orthopnea and palpitations, prompting this admission. He stated he has had no issues ambulating until about 2 months ago when he noticed he would get "winded" more easily but attributed this to weight gain (states his weight is always going up and down 2/2 his diet which includes frequent take-out). Patient has only been hospitalized once for kidney stones 10 years ago, and has no other past medical history to report. States both his maternal grandmother and great-uncle had AICD (unsure why). Denies dizziness, syncope.     Patient had echocardiogram performed, which revealed EF<20%, reduced RV unction, mild MR, mild TR, biatrial enlargement.     Patient had cardiac catheterization done on 6/17, which revealed non-occluded coronary arteries. Patient has been diagnosed with non-ischemic dilated cardiomyopathy. Patient is a 32M w/ no PMHx presents after he woke up from sleep w/ palpitations and SOB. Pt reports about a week ago he vomited and since then he has been having episodes of dry cough, myalgia, and "Constricted" like feeling around chest. He also noticed that past few nights he has been sleeping with head elevation because it felt comfortable. No prior episodes of the same. Currently only c/o fatigue. Denies fever, chills, CP, SOB, palpitations, cough, wheezing, abd pain, nausea, vomiting, diarrhea, constipation, dysuria, or abnormal bleeding.  No Hx of COVID-19 infection. Received total 3 moderna shots. Patient states he has had no previous cardiac history, does not follow with a cardiologist, and has never had any cardiac procedures or testing prior to this admission. Patient reports 6-8 days prior to presentation he was experiencing n/v, dry cough, abdominal bloating, and chest "constriction". The night prior to presentation he was experiencing orthopnea and palpitations, prompting this admission. He stated he has had no issues ambulating until about 2 months ago when he noticed he would get "winded" more easily but attributed this to weight gain (states his weight is always going up and down 2/2 his diet which includes frequent take-out). Patient has only been hospitalized once for kidney stones 10 years ago, and has no other past medical history to report. States both his maternal grandmother and great-uncle had AICD (unsure why). Denies dizziness, syncope.     Patient had echocardiogram performed, which revealed EF<20%, reduced RV unction, mild MR, mild TR, biatrial enlargement. Patient had cardiac catheterization done on 6/17, which revealed non-occluded coronary arteries. Patient has been diagnosed with non-ischemic dilated cardiomyopathy. The patient had a cardiac MRI which showed signs of possible sarcoidosis so EP was consulted and they recommended an outpatient PET scan and life vest prior to discharge in addition to goal directed medical therapy. The patient received life vest and is stable for discharge.

## 2022-06-22 NOTE — PROGRESS NOTE ADULT - ATTENDING COMMENTS
Seen / examined and above reviewed.    Subacute Decompensated Systolic CHF  NICM (new diagnosis)    Breathing comfortable.  Hemodynamics stable.  Negative fluid balance.    - Change Bumex to Torsemide  - Cont Coreg  - Start Entresto  - OOB / Ambulate  - MRI as planned
Seen / examined and above reviewed.    Subacute Decompensated Systolic CHF  NICM (new diagnosis)    Resting.  Hemodynamics stable.    - Cont Bumex.  - Cont Coreg  - Start Entresto tomorrow as planned.  - MRI as planned.
Seen / examined and above reviewed.    No cardiac history.  HTN history ?    Subacute Decompensated Systolic CHF  NICM (new diagnosis / cath today without CAD)    ECHO: Severe LV dysfunction, restrictive filling, 4-chamber enlargement / mild LVH, mild MR / TR.    Breathing comfortable.  Hemodynamics stable.  Mild volume overload.    - Cont Lasix.  - Cont Coreg  - Stop Lisinopril / Start Entresto 2-days.  - Cardiac MRI tomorrow.
Seen / examined and above reviewed.    Subacute Decompensated Systolic CHF  NICM (new diagnosis)    Breathing comfortable.  Hemodynamics stable.  Negative fluid balance.  Compensated / euvolemic.    Tele reviewed / no arrhythmias.    MRI and ECHO reviewed.  nL biventricular size with severe biventricular dysfunction.  Mild to mod concentric LVH.  Nonspecific mid myocardial LGE.    Sarcoid in differential but no definitive evidence.  In absence of arrhythmias, AICD not clearly indicated at present.  Discharge with LifeVest, outpatient PET/CT, and Rx optimization is appropriate.  This was discussed with Dr. Zuniga and Dr. Higgins.    - Cont Torsemide  - Cont Coreg, Entresto, and Aldactone.  - OOB / Ambulate.  - LifeVest  - Plan for discharge tomorrow.
Seen / examined and above reviewed.    Subacute Decompensated Systolic CHF  NICM (new diagnosis)    Breathing comfortable.  Hemodynamics stable.  Negative fluid balance.  Compensated / euvolemic.    MRI completed.    - Decrease Torsemide  - Cont Coreg and Entresto.  - Start Aldactone.  - OOB / Ambulate.    Will review MRI / discuss with Dr. Zuniga.
Seen / examined and above reviewed.    Subacute Decompensated Systolic CHF  NICM (new diagnosis)    Breathing comfortable.  Hemodynamics stable.  Compensated / euvolemic.    - Cont Torsemide  - Cont Coreg, Entresto, and Aldactone.  - OOB / Ambulate.  - LifeVest / discharge once arranged.

## 2022-06-22 NOTE — DISCHARGE NOTE PROVIDER - NSDCHC_MEDRECSTATUS_GEN_ALL_CORE
Admission Reconciliation is Completed  Discharge Reconciliation is Not Complete Admission Reconciliation is Completed  Discharge Reconciliation is Completed Admission Reconciliation is Not Complete  Discharge Reconciliation is Completed Admission Reconciliation is Not Complete  Discharge Reconciliation is Not Complete

## 2022-06-22 NOTE — DISCHARGE NOTE PROVIDER - NSDCFUSCHEDAPPT_GEN_ALL_CORE_FT
Theresa Higgins  [facility] Physician [facility]  CARDIOLOGY [address]  Scheduled Appointment: 09/01/2022

## 2022-06-22 NOTE — DISCHARGE NOTE PROVIDER - NSDCMRMEDTOKEN_GEN_ALL_CORE_FT
carvedilol 6.25 mg oral tablet: 1 tab(s) orally every 12 hours  Farxiga 10 mg oral tablet: 1 tab(s) orally once a day   sacubitril-valsartan 49 mg-51 mg oral tablet: 1 tab(s) orally 2 times a day  spironolactone 25 mg oral tablet: 1 tab(s) orally once a day  torsemide 10 mg oral tablet: 1 tab(s) orally once a day   carvedilol 6.25 mg oral tablet: 1 tab(s) orally every 12 hours  sacubitril-valsartan 49 mg-51 mg oral tablet: 1 tab(s) orally 2 times a day  spironolactone 25 mg oral tablet: 1 tab(s) orally once a day  torsemide 10 mg oral tablet: 1 tab(s) orally once a day

## 2022-06-22 NOTE — DISCHARGE NOTE PROVIDER - PROVIDER TOKENS
PROVIDER:[TOKEN:[[id number]],FOLLOWUP:[2 weeks]] PROVIDER:[TOKEN:[[id number]],FOLLOWUP:[2 weeks]],PROVIDER:[TOKEN:[[id number]],SCHEDULEDAPPT:[09/27/2022]]

## 2022-06-22 NOTE — PROGRESS NOTE ADULT - ASSESSMENT
Impression   ·	Non-ischemic dilated Cardiomyopathy  ·	Hypertensive urgency, resolved   ·	Hypothyroidism  ·	family history of Heart disease  ---------------------------------------------  EKG Sinus tachycardia , LAE, LAFB, non specific T wave changes  ECHO 6/17 EF less than 20%, grade III DD, reduced RV function, mild MR, mild TR, biatrial enlargement   Cath 6/17 normal coronary arteries     Plan    CNS  no sedation    HEENT  Oral care    PULM  HOB at 43  O2 PRN    CARDIO  - keep K>4 and Mg>2  - c/w Entresto 49/51 mg BID   - C/W torsemide 10mg daily (from 20mg BID)  - C/W Coreg 6.25 BID (from 3.125 BID)  - Start spironolactone 25mg daily   - Farxiga 10mg daily upon discharge  - Get BMP daily   - Maintain potassium >4.0, Mg >2.2  - Proper low Na dietary counseling provided   - Cardiac MR as noted above   - Patient does have a LGE, but he does not have any other signs and symptoms suggestive of sarcoidosis so the plan right now is to have PET scan done as an outpatient for definitive diagnosis and the patient will have a life vest in the interim for prevention against SCD  - HF recommendation appreciated  - Discharge with Life Vest today 6/22        RENAL  Monitor Sr Cr  Correct electrolytes as needed     GI  GI ppx as needed    HEM  DVT ppx      SDU   Impression   ·	Non-ischemic dilated Cardiomyopathy  ·	Hypertensive urgency, resolved   ·	Hypothyroidism  ·	family history of Heart disease  ---------------------------------------------  EKG Sinus tachycardia , LAE, LAFB, non specific T wave changes  ECHO 6/17 EF less than 20%, grade III DD, reduced RV function, mild MR, mild TR, biatrial enlargement   Cath 6/17 normal coronary arteries   Cardiac MR suspicious of hypertrophy vs infiltrative disease ie sarcoid PET scan recommended as outpatient     Plan    CNS  no sedation    HEENT  Oral care    PULM  HOB at 43  O2 PRN    CARDIO  - keep K>4 and Mg>2  - c/w Entresto 49/51 mg BID   - C/W torsemide 10mg daily (from 20mg BID)  - C/W Coreg 6.25 BID (from 3.125 BID)  - Start spironolactone 25mg daily   - Farxiga 10mg daily upon discharge  - Proper low Na dietary counseling provided   - Cardiac MR as noted above   - Patient does have a LGE, but he does not have any other signs and symptoms suggestive of sarcoidosis so the plan right now is to have PET scan done as an outpatient for definitive diagnosis and the patient will have a life vest in the interim for prevention against SCD  - HF recommendation appreciated  - Discharge with Life Vest today 6/22        RENAL  Monitor Sr Cr  Correct electrolytes as needed     GI  GI ppx as needed    HEM  DVT ppx      SDU   Impression   ·	Non-ischemic dilated Cardiomyopathy  ·	Hypertensive urgency, resolved   ·	Hypothyroidism  ·	family history of Heart disease  ---------------------------------------------  EKG Sinus tachycardia , LAE, LAFB, non specific T wave changes  ECHO 6/17 EF less than 20%, grade III DD, reduced RV function, mild MR, mild TR, biatrial enlargement   Cath 6/17 normal coronary arteries   Cardiac MR suspicious of hypertrophy vs infiltrative disease ie sarcoid PET scan recommended as outpatient     Plan    CNS  no sedation    HEENT  Oral care    PULM  HOB at 43  O2 PRN    CARDIO  - keep K>4 and Mg>2  - c/w Entresto 49/51 mg BID   - C/W torsemide 10mg daily (from 20mg BID)  - C/W Coreg 6.25 BID (from 3.125 BID)  - Start spironolactone 25mg daily   - Farxiga 10mg daily upon discharge  - Proper low Na dietary counseling provided   - Cardiac MR as noted above   - Patient does have a LGE, but he does not have any other signs and symptoms suggestive of sarcoidosis so the plan right now is to have PET scan done as an outpatient for definitive diagnosis and the patient will have a life vest in the interim for prevention against SCD  - HF recommendation appreciated  - Discharge with Life Vest today 6/22        RENAL  Monitor Sr Cr  Correct electrolytes as needed     GI  GI ppx as needed    HEM  DVT ppx      SDU> Discharge home today

## 2022-06-23 ENCOUNTER — TRANSCRIPTION ENCOUNTER (OUTPATIENT)
Age: 32
End: 2022-06-23

## 2022-06-23 VITALS — HEART RATE: 92 BPM

## 2022-06-23 PROCEDURE — 99239 HOSP IP/OBS DSCHRG MGMT >30: CPT

## 2022-06-23 RX ORDER — ACETAMINOPHEN 500 MG
650 TABLET ORAL EVERY 6 HOURS
Refills: 0 | Status: DISCONTINUED | OUTPATIENT
Start: 2022-06-23 | End: 2022-06-23

## 2022-06-23 RX ORDER — MAGNESIUM SULFATE 500 MG/ML
2 VIAL (ML) INJECTION ONCE
Refills: 0 | Status: COMPLETED | OUTPATIENT
Start: 2022-06-23 | End: 2022-06-23

## 2022-06-23 RX ADMIN — SACUBITRIL AND VALSARTAN 1 TABLET(S): 24; 26 TABLET, FILM COATED ORAL at 17:38

## 2022-06-23 RX ADMIN — SACUBITRIL AND VALSARTAN 1 TABLET(S): 24; 26 TABLET, FILM COATED ORAL at 05:23

## 2022-06-23 RX ADMIN — Medication 650 MILLIGRAM(S): at 10:42

## 2022-06-23 RX ADMIN — Medication 650 MILLIGRAM(S): at 09:15

## 2022-06-23 RX ADMIN — CARVEDILOL PHOSPHATE 6.25 MILLIGRAM(S): 80 CAPSULE, EXTENDED RELEASE ORAL at 05:23

## 2022-06-23 RX ADMIN — SPIRONOLACTONE 25 MILLIGRAM(S): 25 TABLET, FILM COATED ORAL at 05:23

## 2022-06-23 RX ADMIN — Medication 10 MILLIGRAM(S): at 05:23

## 2022-06-23 RX ADMIN — CARVEDILOL PHOSPHATE 6.25 MILLIGRAM(S): 80 CAPSULE, EXTENDED RELEASE ORAL at 17:38

## 2022-06-23 RX ADMIN — CHLORHEXIDINE GLUCONATE 1 APPLICATION(S): 213 SOLUTION TOPICAL at 05:24

## 2022-06-23 RX ADMIN — Medication 25 GRAM(S): at 10:27

## 2022-06-23 NOTE — PROGRESS NOTE ADULT - REASON FOR ADMISSION
[place]-myocarditis

## 2022-06-23 NOTE — DISCHARGE NOTE NURSING/CASE MANAGEMENT/SOCIAL WORK - PATIENT PORTAL LINK FT
You can access the FollowMyHealth Patient Portal offered by [facility] by registering at the following website: [email or web address]. By joining [employer]’s FollowMyHealth portal, you will also be able to view your health information using other applications (apps) compatible with our system.

## 2022-06-23 NOTE — PROGRESS NOTE ADULT - SUBJECTIVE AND OBJECTIVE BOX
Patient is a 32y old  Male who presents with a chief complaint of [place]-myocarditis (2022 13:30)    HPI:  32M w/ no PMHx presents after he woke up from sleep w/ palpitations and SOB. Pt reports about a week ago he vomitted and since then he has been having episodes of dry cough, myalgia, and "Constricted" like feeling around chest. He also noticed that past few nights he has been sleeping with head elevation because it felt comfortable. No prior episodes of the same. Currently only c/o fatigue. Denies fever, chills, CP, SOB, palpitations, cough, wheezing, abd pain, nausea, vomiting, diarrhea, constipation, dysuria, or abnormal bleeding.  No Hx of COVID-19 infection. Received total 3 moderna shots.      (2022 12:33)       INTERVAL HPI/OVERNIGHT EVENTS:   No overnight events   Afebrile, hemodynamically stable     Subjective:    ICU Vital Signs Last 24 Hrs  T(C): 36 (2022 04:07), Max: 36.8 (2022 16:39)  T(F): 96.8 (2022 04:07), Max: 98.3 (2022 16:39)  HR: 65 (2022 04:07) (65 - 83)  BP: 130/79 (2022 04:07) (128/77 - 145/73)  BP(mean): --  ABP: --  ABP(mean): --  RR: 18 (2022 04:07) (18 - 18)  SpO2: 97% (2022 08:00) (97% - 97%)    I&O's Summary    2022 07:01  -  2022 07:00  --------------------------------------------------------  IN: 858 mL / OUT: 2375 mL / NET: -1517 mL          Daily     Daily Weight in k.7 (2022 04:07)    Adult Advanced Hemodynamics Last 24 Hrs  CVP(mm Hg): --  CVP(cm H2O): --  CO: --  CI: --  PA: --  PA(mean): --  PCWP: --  SVR: --  SVRI: --  PVR: --  PVRI: --    EKG/Telemetry Events:    MEDICATIONS  (STANDING):  carvedilol 6.25 milliGRAM(s) Oral every 12 hours  chlorhexidine 4% Liquid 1 Application(s) Topical <User Schedule>  enoxaparin Injectable 40 milliGRAM(s) SubCutaneous every 24 hours  potassium chloride    Tablet ER 20 milliEquivalent(s) Oral every 2 hours  sacubitril 49 mG/valsartan 51 mG 1 Tablet(s) Oral two times a day  spironolactone 25 milliGRAM(s) Oral daily  torsemide 10 milliGRAM(s) Oral daily    MEDICATIONS  (PRN):      PHYSICAL EXAM:  GENERAL:   HEAD:  Atraumatic, Normocephalic  EYES: EOMI, PERRLA, conjunctiva and sclera clear  NECK: Supple, No JVD, Normal thyroid, no enlarged nodes  NERVOUS SYSTEM:  Alert & Awake.   CHEST/LUNG: B/L good air entry; No rales, rhonchi, or wheezing  HEART: S1S2 normal, no S3, Regular rate and rhythm; No murmurs  ABDOMEN: Soft, Nontender, Nondistended; Bowel sounds present  EXTREMITIES:  2+ Peripheral Pulses, No clubbing, cyanosis, or edema  LYMPH: No lymphadenopathy noted  SKIN: No rashes or lesions    LABS:                        17.1   12.04 )-----------( 304      ( 2022 05:48 )             55.6         142  |  104  |  12  ----------------------------<  80  4.2   |  28  |  0.9    Ca    9.3      2022 16:34  Mg     1.7         TPro  6.5  /  Alb  3.7  /  TBili  1.2  /  DBili  x   /  AST  48<H>  /  ALT  24  /  AlkPhos  92      LIVER FUNCTIONS - ( 2022 05:48 )  Alb: 3.7 g/dL / Pro: 6.5 g/dL / ALK PHOS: 92 U/L / ALT: 24 U/L / AST: 48 U/L / GGT: x             CAPILLARY BLOOD GLUCOSE                      RADIOLOGY & ADDITIONAL TESTS:  CXR:        Care Discussed with Consultants/Other Providers [ x] YES  [ ] NO           Patient is a 32y old  Male who presents with a chief complaint of [place]-myocarditis (2022 13:30)    HPI:    32M w/ no PMHx presents after he woke up from sleep w/ palpitations and SOB. Pt reports about a week ago he vomitted and since then he has been having episodes of dry cough, myalgia, and "Constricted" like feeling around chest. He also noticed that past few nights he has been sleeping with head elevation because it felt comfortable. No prior episodes of the same. Currently only c/o fatigue. Denies fever, chills, CP, SOB, palpitations, cough, wheezing, abd pain, nausea, vomiting, diarrhea, constipation, dysuria, or abnormal bleeding.  No Hx of COVID-19 infection. Received total 3 moderna shots.      (2022 12:33)       INTERVAL HPI/OVERNIGHT EVENTS:   No overnight events   Afebrile, hemodynamically stable     Subjective:    ICU Vital Signs Last 24 Hrs  T(C): 36 (2022 04:07), Max: 36.8 (2022 16:39)  T(F): 96.8 (2022 04:07), Max: 98.3 (2022 16:39)  HR: 65 (2022 04:07) (65 - 83)  BP: 130/79 (2022 04:07) (128/77 - 145/73)  BP(mean): --  ABP: --  ABP(mean): --  RR: 18 (2022 04:07) (18 - 18)  SpO2: 97% (2022 08:00) (97% - 97%)    I&O's Summary    2022 07:01  -  2022 07:00  --------------------------------------------------------  IN: 858 mL / OUT: 2375 mL / NET: -1517 mL          Daily     Daily Weight in k.7 (2022 04:07)    Adult Advanced Hemodynamics Last 24 Hrs  CVP(mm Hg): --  CVP(cm H2O): --  CO: --  CI: --  PA: --  PA(mean): --  PCWP: --  SVR: --  SVRI: --  PVR: --  PVRI: --    EKG/Telemetry Events:    MEDICATIONS  (STANDING):  carvedilol 6.25 milliGRAM(s) Oral every 12 hours  chlorhexidine 4% Liquid 1 Application(s) Topical <User Schedule>  enoxaparin Injectable 40 milliGRAM(s) SubCutaneous every 24 hours  potassium chloride    Tablet ER 20 milliEquivalent(s) Oral every 2 hours  sacubitril 49 mG/valsartan 51 mG 1 Tablet(s) Oral two times a day  spironolactone 25 milliGRAM(s) Oral daily  torsemide 10 milliGRAM(s) Oral daily    MEDICATIONS  (PRN):      PHYSICAL EXAM:  GENERAL:   HEAD:  Atraumatic, Normocephalic  EYES: EOMI, PERRLA, conjunctiva and sclera clear  NECK: Supple, No JVD, Normal thyroid, no enlarged nodes  NERVOUS SYSTEM:  Alert & Awake.   CHEST/LUNG: B/L good air entry; No rales, rhonchi, or wheezing  HEART: S1S2 normal, no S3, Regular rate and rhythm; No murmurs  ABDOMEN: Soft, Nontender, Nondistended; Bowel sounds present  EXTREMITIES:  2+ Peripheral Pulses, No clubbing, cyanosis, or edema  LYMPH: No lymphadenopathy noted  SKIN: No rashes or lesions    LABS:                        17.1   12.04 )-----------( 304      ( 2022 05:48 )             55.6         142  |  104  |  12  ----------------------------<  80  4.2   |  28  |  0.9    Ca    9.3      2022 16:34  Mg     1.7         TPro  6.5  /  Alb  3.7  /  TBili  1.2  /  DBili  x   /  AST  48<H>  /  ALT  24  /  AlkPhos  92      LIVER FUNCTIONS - ( 2022 05:48 )  Alb: 3.7 g/dL / Pro: 6.5 g/dL / ALK PHOS: 92 U/L / ALT: 24 U/L / AST: 48 U/L / GGT: x             CAPILLARY BLOOD GLUCOSE                      RADIOLOGY & ADDITIONAL TESTS:  CXR:        Care Discussed with Consultants/Other Providers [ x] YES  [ ] NO

## 2022-06-23 NOTE — DISCHARGE NOTE NURSING/CASE MANAGEMENT/SOCIAL WORK - NSDCPEFALRISK_GEN_ALL_CORE
For information on Fall & Injury Prevention, visit: [email or web address] OR  [email or web address] OR  https://www.cdc.gov/steadi/patient.html

## 2022-06-23 NOTE — PROGRESS NOTE ADULT - ASSESSMENT
·	Non-ischemic dilated Cardiomyopathy  ·	Hypertensive urgency, resolved   ·	Hypothyroidism  ·	family history of Heart disease  ---------------------------------------------  EKG Sinus tachycardia , LAE, LAFB, non specific T wave changes  ECHO 6/17 EF less than 20%, grade III DD, reduced RV function, mild MR, mild TR, biatrial enlargement   Cath 6/17 normal coronary arteries   Cardiac MR suspicious of hypertrophy vs infiltrative disease ie sarcoid PET scan recommended as outpatient     Plan    CNS  no sedation    HEENT  Oral care    PULM  HOB at 45  O2 PRN    CARDIO  - keep K>4 and Mg>2  - c/w Entresto 49/51 mg BID   - C/W torsemide 10mg daily (from 20mg BID)  - C/W Coreg 6.25 BID (from 3.125 BID)  - Start spironolactone 25mg daily   - Farxiga 10mg daily upon discharge  - Proper low Na dietary counseling provided   - Cardiac MR as noted above   - Patient does have a LGE, but he does not have any other signs and symptoms suggestive of sarcoidosis so the plan right now is to have PET scan done as an outpatient for definitive diagnosis and the patient will have a life vest in the interim for prevention against SCD  - HF recommendation appreciated  - Discharge with Life Vest today 6/22        RENAL  Monitor Sr Cr  Correct electrolytes as needed     GI  GI ppx as needed    HEM  DVT ppx      SDU> Discharge home today      Impression   ·	Non-ischemic dilated Cardiomyopathy  ·	Hypertensive urgency, resolved   ·	Hypothyroidism  ·	family history of Heart disease  ---------------------------------------------  EKG Sinus tachycardia , LAE, LAFB, non specific T wave changes  ECHO 6/17 EF less than 20%, grade III DD, reduced RV function, mild MR, mild TR, biatrial enlargement   Cath 6/17 normal coronary arteries   Cardiac MR suspicious of hypertrophy vs infiltrative disease ie sarcoid PET scan recommended as outpatient     Plan    CNS  no sedation    HEENT  Oral care    PULM  HOB at 43  O2 PRN    CARDIO  - keep K>4 and Mg>2  - c/w Entresto 49/51 mg BID   - C/W torsemide 10mg daily (from 20mg BID)  - C/W Coreg 6.25 BID (from 3.125 BID)  - Start spironolactone 25mg daily   - Farxiga 10mg daily upon discharge  - Proper low Na dietary counseling provided   - Cardiac MR as noted above   - Patient does have a LGE, but he does not have any other signs and symptoms suggestive of sarcoidosis so the plan right now is to have PET scan done as an outpatient for definitive diagnosis and the patient will have a life vest in the interim for prevention against SCD  - HF recommendation appreciated  - Discharge with Life Vest today 6/23        RENAL  Monitor Sr Cr  Correct electrolytes as needed     GI  GI ppx as needed    HEM  DVT ppx      SDU> Discharge home today      Impression:     ·	Non-ischemic dilated Cardiomyopathy  ·	Hypertensive urgency, resolved   ·	Hypothyroidism  ·	family history of Heart disease  ---------------------------------------------  EKG Sinus tachycardia , LAE, LAFB, non specific T wave changes  ECHO 6/17 EF less than 20%, grade III DD, reduced RV function, mild MR, mild TR, biatrial enlargement   Cath 6/17 normal coronary arteries   Cardiac MR suspicious of hypertrophy vs infiltrative disease ie sarcoid PET scan recommended as outpatient     Plan    CNS  no sedation    HEENT  Oral care    PULM  HOB at 43  O2 PRN    CARDIO  - keep K>4 and Mg>2  - c/w Entresto 49/51 mg BID   - C/W torsemide 10mg daily (from 20mg BID)  - C/W Coreg 6.25 BID (from 3.125 BID)  - Start spironolactone 25mg daily   - Farxiga 10mg daily upon discharge  - Proper low Na dietary counseling provided   - Cardiac MR as noted above   - Patient does have a LGE, but he does not have any other signs and symptoms suggestive of sarcoidosis so the plan right now is to have PET scan done as an outpatient for definitive diagnosis and the patient will have a life vest in the interim for prevention against SCD  - HF recommendation appreciated  - Discharge with Life Vest today 6/23        RENAL  Monitor Sr Cr  Correct electrolytes as needed     GI  GI ppx as needed    HEM  DVT ppx      SDU> Discharge home today after Life Vest

## 2022-06-23 NOTE — DISCHARGE NOTE NURSING/CASE MANAGEMENT/SOCIAL WORK - NSDCFUADDAPPT_GEN_ALL_CORE_FT
APPTS ARE READY TO BE MADE: [ X] YES    Best Family or Patient Contact (if needed):    Additional Information about above appointments (if needed): within 3 weeks of discharge    1: Dr. Efrain Randolph  2:   3:     Other comments or requests:

## 2022-06-24 PROBLEM — Z00.00 ENCOUNTER FOR PREVENTIVE HEALTH EXAMINATION: Status: ACTIVE | Noted: 2022-06-24

## 2022-06-29 DIAGNOSIS — I11.0 HYPERTENSIVE HEART DISEASE WITH HEART FAILURE: ICD-10-CM

## 2022-06-29 DIAGNOSIS — I42.0 DILATED CARDIOMYOPATHY: ICD-10-CM

## 2022-06-29 DIAGNOSIS — I08.1 RHEUMATIC DISORDERS OF BOTH MITRAL AND TRICUSPID VALVES: ICD-10-CM

## 2022-06-29 DIAGNOSIS — E83.42 HYPOMAGNESEMIA: ICD-10-CM

## 2022-06-29 DIAGNOSIS — I51.4 MYOCARDITIS, UNSPECIFIED: ICD-10-CM

## 2022-06-29 DIAGNOSIS — E87.6 HYPOKALEMIA: ICD-10-CM

## 2022-06-29 DIAGNOSIS — E03.9 HYPOTHYROIDISM, UNSPECIFIED: ICD-10-CM

## 2022-06-29 DIAGNOSIS — I16.0 HYPERTENSIVE URGENCY: ICD-10-CM

## 2022-06-29 DIAGNOSIS — D86.9 SARCOIDOSIS, UNSPECIFIED: ICD-10-CM

## 2022-06-29 DIAGNOSIS — I50.21 ACUTE SYSTOLIC (CONGESTIVE) HEART FAILURE: ICD-10-CM

## 2022-06-29 DIAGNOSIS — E66.9 OBESITY, UNSPECIFIED: ICD-10-CM

## 2022-07-07 ENCOUNTER — APPOINTMENT (OUTPATIENT)
Dept: CARDIOLOGY | Facility: CLINIC | Age: 32
End: 2022-07-07

## 2022-07-07 VITALS
DIASTOLIC BLOOD PRESSURE: 80 MMHG | OXYGEN SATURATION: 96 % | HEIGHT: 65 IN | TEMPERATURE: 97.5 F | HEART RATE: 92 BPM | BODY MASS INDEX: 44.67 KG/M2 | SYSTOLIC BLOOD PRESSURE: 118 MMHG | WEIGHT: 268.1 LBS

## 2022-07-07 DIAGNOSIS — N20.0 CALCULUS OF KIDNEY: ICD-10-CM

## 2022-07-07 DIAGNOSIS — Z82.49 FAMILY HISTORY OF ISCHEMIC HEART DISEASE AND OTHER DISEASES OF THE CIRCULATORY SYSTEM: ICD-10-CM

## 2022-07-07 DIAGNOSIS — Z78.9 OTHER SPECIFIED HEALTH STATUS: ICD-10-CM

## 2022-07-07 PROCEDURE — 99214 OFFICE O/P EST MOD 30 MIN: CPT

## 2022-07-08 LAB
ALBUMIN SERPL ELPH-MCNC: 4.8 G/DL
ALP BLD-CCNC: 89 U/L
ALT SERPL-CCNC: 47 U/L
ANION GAP SERPL CALC-SCNC: 16 MMOL/L
AST SERPL-CCNC: 48 U/L
BILIRUB SERPL-MCNC: 1 MG/DL
BUN SERPL-MCNC: 18 MG/DL
CALCIUM SERPL-MCNC: 9.9 MG/DL
CHLORIDE SERPL-SCNC: 99 MMOL/L
CO2 SERPL-SCNC: 23 MMOL/L
CREAT SERPL-MCNC: 1.1 MG/DL
EGFR: 90 ML/MIN/1.73M2
GLUCOSE SERPL-MCNC: 89 MG/DL
MAGNESIUM SERPL-MCNC: 2 MG/DL
POTASSIUM SERPL-SCNC: 5.1 MMOL/L
PROT SERPL-MCNC: 8 G/DL
SODIUM SERPL-SCNC: 138 MMOL/L

## 2022-07-08 NOTE — CARDIOLOGY SUMMARY
[de-identified] : TTE 6/17/22 EF < 20 %,  elevated LV , diastolic pressure,  Grade III diastolic dysfunction, Severely reduced RV systolic function, Mild MVR, pulm artery systolic press 46.6, mild Pulm HTN

## 2022-07-08 NOTE — PHYSICAL EXAM
[Well Developed] : well developed [Well Nourished] : well nourished [No Acute Distress] : no acute distress [Normal Conjunctiva] : normal conjunctiva [No Carotid Bruit] : no carotid bruit [Normal S1, S2] : normal S1, S2 [No Murmur] : no murmur [No Rub] : no rub [No Gallop] : no gallop [Clear Lung Fields] : clear lung fields [Good Air Entry] : good air entry [No Respiratory Distress] : no respiratory distress  [Soft] : abdomen soft [Non Tender] : non-tender [No Masses/organomegaly] : no masses/organomegaly [Normal Bowel Sounds] : normal bowel sounds [Moves all extremities] : moves all extremities [No Focal Deficits] : no focal deficits [Normal Speech] : normal speech [No ulcers] : no ulcers [No varicosities] : no varicosities [No chronic venous stasis changes] : no chronic venous stasis changes [No cyanosis] : no cyanosis [No rashes] : no rashes [Present] : present on the right [Trace] : Left: trace [de-identified] : trace ankle and pedal edema

## 2022-07-08 NOTE — REVIEW OF SYSTEMS
[Lower Ext Edema] : lower extremity edema [Negative] : Heme/Lymph [SOB] : no shortness of breath [Dyspnea on exertion] : not dyspnea during exertion [Leg Claudication] : no intermittent leg claudication [Palpitations] : no palpitations [Syncope] : no syncope

## 2022-07-08 NOTE — ASSESSMENT
[FreeTextEntry1] : Assessment:\par #Non-ischemic cardiomyopathy, HFrEF <20 % \par - Euvolemic, well compensated\par - Compliant with medications\par - Cardiac MRI with concern for sarcoidosis, needs PET scan\par #Lifevest\par - Patient needs to be on maximally tolerated GDMT for 3 months \par #BMI 44.61\par \par Plan:\par - Cont Entresto 49-51 mg BID, Farxiga 10 mg daily, Spironolactone 25 mg daily, Torsemide 10 mg daily\par - Increase Carvedilol from 6.25 mg BID to 12.5mg BID\par - EP f.u \par   will contact EP regarding live vest  not being covered by insurance, per patient hospital paid for 1 month\par - Low Na diet, fluid restriction  \par - Daily wts \par - Dietician f/u for fluid restriction, sodium restriction and wt loss\par - Labs: CBC, Magn level, BNP \par - PET scan to r/o Sarcoidosis, authorization in process\par - F/u with HF team on 7/19/22\par - Return to clinic in 6 months  and  PRN\par

## 2022-07-08 NOTE — HISTORY OF PRESENT ILLNESS
[FreeTextEntry1] : Pt is 32 Year old male with new diagnosis of non-ischemic CMP and HFrEF (LVEF <20%) 6/2022 here for hospital follow-up. \par \par S/p hospitalization at [facility] with severe palpitations, tachycardia,  severe fatigue,  rapid breathing. was d/c home with a life vest no shocks.  .  He had Covid a few days after discharge, reports mild symptoms was on Paxlovid.  As per pt he was ordered PET scan during hospital dc. to r/o Sarcodosis.  Pt denies any chest pain, no palpitations, no dizziness, improved endurance, walking QD 0.25 miles without cardiac symptoms. Compliant with medications. \par \par Pt is back at work. \par \par TTE 6/17/22 EF < 20 %,  elevated LV , diastolic pressure,  Grade III diastolic dysfunction, Severely reduced RV systolic function, Mild MVR, pulm artery systolic press 46.6, mild Pulm HTN. \par \par BNP was 1827.  \par \par Cardiac Cath on 6/17/22 normal coronaries

## 2022-07-19 ENCOUNTER — APPOINTMENT (OUTPATIENT)
Dept: CARDIOLOGY | Facility: CLINIC | Age: 32
End: 2022-07-19

## 2022-07-19 VITALS
SYSTOLIC BLOOD PRESSURE: 122 MMHG | TEMPERATURE: 97.5 F | WEIGHT: 265 LBS | BODY MASS INDEX: 44.15 KG/M2 | HEART RATE: 68 BPM | HEIGHT: 65 IN | RESPIRATION RATE: 15 BRPM | OXYGEN SATURATION: 97 % | DIASTOLIC BLOOD PRESSURE: 96 MMHG

## 2022-07-19 LAB
ANION GAP SERPL CALC-SCNC: 13 MMOL/L
BUN SERPL-MCNC: 13 MG/DL
CALCIUM SERPL-MCNC: 9.9 MG/DL
CHLORIDE SERPL-SCNC: 102 MMOL/L
CO2 SERPL-SCNC: 25 MMOL/L
CREAT SERPL-MCNC: 1.1 MG/DL
EGFR: 90 ML/MIN/1.73M2
GLUCOSE SERPL-MCNC: 91 MG/DL
MAGNESIUM SERPL-MCNC: 1.8 MG/DL
POTASSIUM SERPL-SCNC: 4 MMOL/L
SODIUM SERPL-SCNC: 140 MMOL/L

## 2022-07-19 PROCEDURE — 93000 ELECTROCARDIOGRAM COMPLETE: CPT

## 2022-07-19 PROCEDURE — 99214 OFFICE O/P EST MOD 30 MIN: CPT | Mod: 25

## 2022-07-24 NOTE — HISTORY OF PRESENT ILLNESS
[FreeTextEntry1] : The patient is a 31 y/o male with no PMHx, recently admitted to [facility] from 6/16 to 6/23 with chest pain, palpitations, and SOB. He was diagnosed with NICM HFrEF LVEF ~ 20%. The patient had cardiac catheterization done on 6/17, which revealed non-occluded coronary arteries, and a cardiac MRI which showed signs of possible sarcoidosis; he was d/c home with a Life vest, here today for a hospital follow-up. \par \par   Pending PET Scan to r/o sarcoidosis.   \par \par He reports doing well at home, walking for about one block, and exercising on a half bike for about 10 minutes a day without any SOB. He also endorses bendopnea and LH/dizziness, his weight has been stable at around 262 to 264 lbs, and his SBP fluctuates between 110 to 120s. He denies CP, bleeding, SOB at rest, PND, abdominal discomfort, BLE edema, palpitations, and syncope.\par \par   He takes his medications as indicated and is following a low sodium diet.

## 2022-07-24 NOTE — CARDIOLOGY SUMMARY
[de-identified] : TTE 6/16/22\par \par Summary:\par 1. Left ventricular ejection fraction, by visual estimation, is <20%.\par  2. Elevated left ventricular end-diastolic pressure.\par  3. Mildly increased LV wall thickness.\par  4. Spectral Doppler shows restrictive pattern of left ventricular myocardial filling (Grade III diastolic dysfunction).\par  5. Mildly enlarged right ventricle.\par  6. Severely reduced RV systolic function.\par  7. Moderately enlarged left atrium.\par  8. Moderately enlarged right atrium.\par  9. Mild mitral valve regurgitation.\par 10. Mild tricuspid regurgitation.\par 11. Mild aortic regurgitation.\par 12. Estimated pulmonary artery systolic pressure is 46.6 mmHg assuming a \par right atrial pressure of 3 mmHg, which is consistent with mild pulmonary \par hypertension.\par  [de-identified] : Cardiac MRI\par \par IMPRESSION:\par \par 1. Concentric left ventricular hypertrophy, severe global hypokinesis \par with additional findings of mid myocardial lategadolinium enhancement \par within the lateral wall and mid inferoseptum. Findings may be related to \par concentric hypertrophic cardiomyopathy versus infiltrative disease \par (sarcoid). PET/CT may be obtained for further evaluation.\par \par 2. Mild right ventricular hypertrophy with severely depressed function.\par  [de-identified] : [facility] 6/17/2\par \par Showed no evidence of CAD

## 2022-07-26 ENCOUNTER — OUTPATIENT (OUTPATIENT)
Dept: OUTPATIENT SERVICES | Facility: HOSPITAL | Age: 32
LOS: 1 days | Discharge: HOME | End: 2022-07-26

## 2022-07-26 ENCOUNTER — APPOINTMENT (OUTPATIENT)
Dept: NUTRITION | Facility: CLINIC | Age: 32
End: 2022-07-26

## 2022-07-26 DIAGNOSIS — Z78.9 OTHER SPECIFIED HEALTH STATUS: Chronic | ICD-10-CM

## 2022-08-01 ENCOUNTER — OUTPATIENT (OUTPATIENT)
Dept: OUTPATIENT SERVICES | Facility: HOSPITAL | Age: 32
LOS: 1 days | Discharge: HOME | End: 2022-08-01

## 2022-08-01 ENCOUNTER — RESULT REVIEW (OUTPATIENT)
Age: 32
End: 2022-08-01

## 2022-08-01 DIAGNOSIS — Z78.9 OTHER SPECIFIED HEALTH STATUS: Chronic | ICD-10-CM

## 2022-08-01 DIAGNOSIS — I42.9 CARDIOMYOPATHY, UNSPECIFIED: ICD-10-CM

## 2022-08-01 PROCEDURE — 78452 HT MUSCLE IMAGE SPECT MULT: CPT | Mod: 26

## 2022-08-05 ENCOUNTER — OUTPATIENT (OUTPATIENT)
Dept: OUTPATIENT SERVICES | Facility: HOSPITAL | Age: 32
LOS: 1 days | Discharge: HOME | End: 2022-08-05

## 2022-08-05 ENCOUNTER — RESULT REVIEW (OUTPATIENT)
Age: 32
End: 2022-08-05

## 2022-08-05 DIAGNOSIS — I42.9 CARDIOMYOPATHY, UNSPECIFIED: ICD-10-CM

## 2022-08-05 DIAGNOSIS — Z78.9 OTHER SPECIFIED HEALTH STATUS: Chronic | ICD-10-CM

## 2022-08-05 LAB — GLUCOSE BLDC GLUCOMTR-MCNC: 90 MG/DL — SIGNIFICANT CHANGE UP (ref 70–99)

## 2022-08-05 PROCEDURE — 78815 PET IMAGE W/CT SKULL-THIGH: CPT | Mod: 26

## 2022-08-09 ENCOUNTER — TRANSCRIPTION ENCOUNTER (OUTPATIENT)
Age: 32
End: 2022-08-09

## 2022-08-10 ENCOUNTER — APPOINTMENT (OUTPATIENT)
Dept: CARDIOLOGY | Facility: CLINIC | Age: 32
End: 2022-08-10

## 2022-08-10 VITALS
DIASTOLIC BLOOD PRESSURE: 70 MMHG | WEIGHT: 265 LBS | SYSTOLIC BLOOD PRESSURE: 110 MMHG | BODY MASS INDEX: 44.15 KG/M2 | TEMPERATURE: 98 F | HEART RATE: 80 BPM | HEIGHT: 65 IN | OXYGEN SATURATION: 97 %

## 2022-08-10 PROCEDURE — 99214 OFFICE O/P EST MOD 30 MIN: CPT | Mod: 25

## 2022-08-10 PROCEDURE — 93000 ELECTROCARDIOGRAM COMPLETE: CPT

## 2022-08-17 NOTE — HISTORY OF PRESENT ILLNESS
[FreeTextEntry1] : The patient is a 31 y/o male with no PMHx, recently admitted to [facility] from 6/16 to 6/23 with chest pain, palpitations, and SOB. He was diagnosed with NICM HFrEF LVEF ~ 20%. The patient had cardiac catheterization done on 6/17, which revealed non-occluded coronary arteries. A cardiac MRI showed signs of possible sarcoidosis; he was d/c home with a Life vest, here today for a follow-up.    \par \par The patient reports feeling well; he can walk over 1/2 a mile daily and uses a mid-bike for about 10 mins 3-4 times a week. His only complaint today is mild dizzy spells when changing positions suddenly. SBP stays at 110s, and his weight has been stable at 265 lbs; at home, he is following up with a nutritionist. He denies CP, bleeding, SOB at rest, PND, abdominal discomfort, LH/dizziness, BLE edema, palpitations, and syncope. He does not restrict fluid or salt strictly and takes his medications as directed.    He takes his medications as indicated and is following a low sodium diet.

## 2022-08-17 NOTE — CARDIOLOGY SUMMARY
[de-identified] : TTE 6/16/22\par \par Summary:\par 1. Left ventricular ejection fraction, by visual estimation, is <20%.\par  2. Elevated left ventricular end-diastolic pressure.\par  3. Mildly increased LV wall thickness.\par  4. Spectral Doppler shows restrictive pattern of left ventricular myocardial filling (Grade III diastolic dysfunction).\par  5. Mildly enlarged right ventricle.\par  6. Severely reduced RV systolic function.\par  7. Moderately enlarged left atrium.\par  8. Moderately enlarged right atrium.\par  9. Mild mitral valve regurgitation.\par 10. Mild tricuspid regurgitation.\par 11. Mild aortic regurgitation.\par 12. Estimated pulmonary artery systolic pressure is 46.6 mmHg assuming a \par right atrial pressure of 3 mmHg, which is consistent with mild pulmonary \par hypertension.\par  [de-identified] : Cardiac MRI\par \par IMPRESSION:\par \par 1. Concentric left ventricular hypertrophy, severe global hypokinesis \par with additional findings of mid myocardial lategadolinium enhancement \par within the lateral wall and mid inferoseptum. Findings may be related to \par concentric hypertrophic cardiomyopathy versus infiltrative disease \par (sarcoid). PET/CT may be obtained for further evaluation.\par \par 2. Mild right ventricular hypertrophy with severely depressed function.\par  [de-identified] : [facility] 6/17/2\par \par Showed no evidence of CAD

## 2022-09-01 LAB
ALBUMIN SERPL ELPH-MCNC: 4.5 G/DL
ALP BLD-CCNC: 85 U/L
ALT SERPL-CCNC: 27 U/L
ANION GAP SERPL CALC-SCNC: 11 MMOL/L
AST SERPL-CCNC: 25 U/L
BILIRUB SERPL-MCNC: 0.5 MG/DL
BUN SERPL-MCNC: 9 MG/DL
CALCIUM SERPL-MCNC: 9.7 MG/DL
CHLORIDE SERPL-SCNC: 103 MMOL/L
CO2 SERPL-SCNC: 25 MMOL/L
CREAT SERPL-MCNC: 0.9 MG/DL
EGFR: 115 ML/MIN/1.73M2
GLUCOSE SERPL-MCNC: 100 MG/DL
MAGNESIUM SERPL-MCNC: 1.9 MG/DL
NT-PROBNP SERPL-MCNC: 30 PG/ML
POTASSIUM SERPL-SCNC: 4.5 MMOL/L
PROT SERPL-MCNC: 7.4 G/DL
SODIUM SERPL-SCNC: 139 MMOL/L

## 2022-09-23 ENCOUNTER — APPOINTMENT (OUTPATIENT)
Dept: CARDIOLOGY | Facility: CLINIC | Age: 32
End: 2022-09-23

## 2022-09-23 VITALS
WEIGHT: 267.8 LBS | HEART RATE: 66 BPM | BODY MASS INDEX: 44.62 KG/M2 | SYSTOLIC BLOOD PRESSURE: 110 MMHG | HEIGHT: 65 IN | OXYGEN SATURATION: 95 % | DIASTOLIC BLOOD PRESSURE: 70 MMHG

## 2022-09-23 PROCEDURE — 93000 ELECTROCARDIOGRAM COMPLETE: CPT

## 2022-09-23 PROCEDURE — 93306 TTE W/DOPPLER COMPLETE: CPT

## 2022-09-23 PROCEDURE — 99214 OFFICE O/P EST MOD 30 MIN: CPT | Mod: 25

## 2022-09-23 NOTE — CARDIOLOGY SUMMARY
[de-identified] : TTE 6/16/22\par \par Summary:\par 1. Left ventricular ejection fraction, by visual estimation, is <20%.\par  2. Elevated left ventricular end-diastolic pressure.\par  3. Mildly increased LV wall thickness.\par  4. Spectral Doppler shows restrictive pattern of left ventricular myocardial filling (Grade III diastolic dysfunction).\par  5. Mildly enlarged right ventricle.\par  6. Severely reduced RV systolic function.\par  7. Moderately enlarged left atrium.\par  8. Moderately enlarged right atrium.\par  9. Mild mitral valve regurgitation.\par 10. Mild tricuspid regurgitation.\par 11. Mild aortic regurgitation.\par 12. Estimated pulmonary artery systolic pressure is 46.6 mmHg assuming a \par right atrial pressure of 3 mmHg, which is consistent with mild pulmonary \par hypertension.\par  [de-identified] : Cardiac MRI\par \par IMPRESSION:\par \par 1. Concentric left ventricular hypertrophy, severe global hypokinesis \par with additional findings of mid myocardial lategadolinium enhancement \par within the lateral wall and mid inferoseptum. Findings may be related to \par concentric hypertrophic cardiomyopathy versus infiltrative disease \par (sarcoid). PET/CT may be obtained for further evaluation.\par \par 2. Mild right ventricular hypertrophy with severely depressed function.\par  [de-identified] : [facility] 6/17/2\par \par Showed no evidence of CAD

## 2022-09-23 NOTE — HISTORY OF PRESENT ILLNESS
[FreeTextEntry1] : The patient is a 31 y/o male with no PMHx, admitted to [facility] from 6/16/2022 to 6/23 with chest pain, palpitations, and SOB. He was diagnosed with NICM HFrEF LVEF < 20%. The patient had cardiac catheterization done on 6/17, which revealed non-occluded coronary arteries. A cardiac MRI showed signs of possible sarcoidosis; he was d/c home with a Life vest.     \par \par A PET scan done as outpatient failed to show any myocardial uptake. He comes here today for follow up. He reports being very active. He walks ~ half a mile every day without any significant limitation. He denies any chest pain, LOC, N/V, intolerance to meds. \par \par A TTE done today showed recovered LV systolic function, LVEF is at least 45% from less than 20% in June. Filling pressures are normal with IVC <2 cm and collapsing.

## 2022-09-28 ENCOUNTER — APPOINTMENT (OUTPATIENT)
Dept: CARDIOLOGY | Facility: CLINIC | Age: 32
End: 2022-09-28

## 2022-09-28 VITALS
OXYGEN SATURATION: 99 % | HEIGHT: 65 IN | DIASTOLIC BLOOD PRESSURE: 70 MMHG | HEART RATE: 74 BPM | BODY MASS INDEX: 44.98 KG/M2 | WEIGHT: 270 LBS | SYSTOLIC BLOOD PRESSURE: 122 MMHG

## 2022-09-28 PROCEDURE — 93000 ELECTROCARDIOGRAM COMPLETE: CPT

## 2022-09-28 PROCEDURE — 99214 OFFICE O/P EST MOD 30 MIN: CPT | Mod: 25

## 2022-10-13 NOTE — HISTORY OF PRESENT ILLNESS
[FreeTextEntry1] : Mr. GIPSON is a 32 year-year old male with history of Nonischemic cardiomyopathy, + LGE on MRI, Morbid obesity is here for f-up.\par \par Wearing lifevest\par \par Denies chest pain, shortness of breath, palpitation, dizziness or LOC except noted above.\par \par EKG (09/28/22): SR 74, , QRSd 106\par TTE (09/22): EF 54, concentric LVH\par Cardio: Dr. Smith\par HF: Dr. Zuniga

## 2022-10-13 NOTE — ASSESSMENT
[FreeTextEntry1] : ## Nonischemic cardiomyopathy \par ## LGE on MRI\par \par - EF improved\par - GDMT as per HF\par - DC lifevest\par - Discussed options of long term rhythm monitoring. He declined at this time\par - Return as needed

## 2022-10-18 ENCOUNTER — NON-APPOINTMENT (OUTPATIENT)
Age: 32
End: 2022-10-18

## 2022-10-18 ENCOUNTER — OUTPATIENT (OUTPATIENT)
Dept: OUTPATIENT SERVICES | Facility: HOSPITAL | Age: 32
LOS: 1 days | Discharge: HOME | End: 2022-10-18

## 2022-10-18 ENCOUNTER — APPOINTMENT (OUTPATIENT)
Dept: NUTRITION | Facility: CLINIC | Age: 32
End: 2022-10-18

## 2022-10-18 DIAGNOSIS — Z78.9 OTHER SPECIFIED HEALTH STATUS: Chronic | ICD-10-CM

## 2022-10-19 DIAGNOSIS — I50.9 HEART FAILURE, UNSPECIFIED: ICD-10-CM

## 2022-11-02 ENCOUNTER — APPOINTMENT (OUTPATIENT)
Dept: NUTRITION | Facility: CLINIC | Age: 32
End: 2022-11-02

## 2022-12-15 ENCOUNTER — APPOINTMENT (OUTPATIENT)
Dept: CARDIOLOGY | Facility: CLINIC | Age: 32
End: 2022-12-15

## 2022-12-15 VITALS
BODY MASS INDEX: 44.92 KG/M2 | WEIGHT: 269.6 LBS | HEART RATE: 76 BPM | OXYGEN SATURATION: 96 % | HEIGHT: 65 IN | DIASTOLIC BLOOD PRESSURE: 74 MMHG | SYSTOLIC BLOOD PRESSURE: 120 MMHG

## 2022-12-15 DIAGNOSIS — R06.83 SNORING: ICD-10-CM

## 2022-12-15 PROCEDURE — 99214 OFFICE O/P EST MOD 30 MIN: CPT

## 2022-12-15 NOTE — CARDIOLOGY SUMMARY
[de-identified] : EKG 9/28/22 Normal sinus rhythm 74 bpm, LAFB [de-identified] : TTE 6/17/22 EF < 20 %,  elevated LV , diastolic pressure,  Grade III diastolic dysfunction, Severely reduced RV systolic function, Mild MVR, pulm artery systolic press 46.6, mild Pulm HTN

## 2022-12-15 NOTE — PHYSICAL EXAM
[Well Developed] : well developed [Well Nourished] : well nourished [No Acute Distress] : no acute distress [Normal Conjunctiva] : normal conjunctiva [No Carotid Bruit] : no carotid bruit [Normal S1, S2] : normal S1, S2 [No Murmur] : no murmur [No Rub] : no rub [No Gallop] : no gallop [Clear Lung Fields] : clear lung fields [Good Air Entry] : good air entry [No Respiratory Distress] : no respiratory distress  [Soft] : abdomen soft [Non Tender] : non-tender [No Masses/organomegaly] : no masses/organomegaly [Normal Bowel Sounds] : normal bowel sounds [Moves all extremities] : moves all extremities [No Focal Deficits] : no focal deficits [Normal Speech] : normal speech [No varicosities] : no varicosities [No chronic venous stasis changes] : no chronic venous stasis changes [No rashes] : no rashes [Present] : present on the right [No edema] : no edema

## 2022-12-15 NOTE — HISTORY OF PRESENT ILLNESS
[FreeTextEntry1] : Pt is 32 Year old male with non-ischemic CMP and HFrEF (LVEF <20%) 6/2022, now with recovering EF here for follow-up. \par \par 12/15/22 \par Pt reports that he is feeling better, denies SOB, no chest pain.  Pt hasn’t been taking Torsemide for the past 3 weeks.  S/p EP f.u Pt decline long- term rhythm monitoring.  s/p Covid in october \par \par TTE 09/23/22 TTE EF 54%.  Life Vest was sent back. \par Pt exercises on regular basis without cardiac issues \par 09/1/22 BNP 30 BUN 9/ Cr 0.9 \par \par 7/7/22\par S/p hospitalization at [facility] with severe palpitations, tachycardia,  severe fatigue,  rapid breathing. was d/c home with a life vest no shocks.  .  He had Covid a few days after discharge, reports mild symptoms was on Paxlovid.  As per pt he was ordered PET scan during hospital dc. to r/o Sarcodosis.  Pt denies any chest pain, no palpitations, no dizziness, improved endurance, walking QD 0.25 miles without cardiac symptoms. Compliant with medications. \par \par Pt is back at work. \par \par TTE 6/17/22 EF < 20 %,  elevated LV , diastolic pressure,  Grade III diastolic dysfunction, Severely reduced RV systolic function, Mild MVR, pulm artery systolic press 46.6, mild Pulm HTN. \par \par BNP was 1827.  \par \par Cardiac Cath on 6/17/22 normal coronaries

## 2022-12-15 NOTE — REVIEW OF SYSTEMS
[SOB] : no shortness of breath [Dyspnea on exertion] : not dyspnea during exertion [Chest Discomfort] : no chest discomfort [Lower Ext Edema] : no extremity edema [Leg Claudication] : no intermittent leg claudication [Palpitations] : no palpitations [Orthopnea] : no orthopnea [PND] : no PND [Syncope] : no syncope

## 2023-03-03 ENCOUNTER — APPOINTMENT (OUTPATIENT)
Dept: CARDIOLOGY | Facility: CLINIC | Age: 33
End: 2023-03-03
Payer: COMMERCIAL

## 2023-03-03 VITALS
DIASTOLIC BLOOD PRESSURE: 80 MMHG | OXYGEN SATURATION: 97 % | WEIGHT: 265 LBS | HEART RATE: 64 BPM | HEIGHT: 65 IN | BODY MASS INDEX: 44.15 KG/M2 | SYSTOLIC BLOOD PRESSURE: 120 MMHG

## 2023-03-03 PROCEDURE — 99214 OFFICE O/P EST MOD 30 MIN: CPT | Mod: 25

## 2023-03-03 PROCEDURE — 93000 ELECTROCARDIOGRAM COMPLETE: CPT

## 2023-03-08 NOTE — CARDIOLOGY SUMMARY
[de-identified] : TTE 6/16/22\par \par Summary:\par 1. Left ventricular ejection fraction, by visual estimation, is <20%.\par  2. Elevated left ventricular end-diastolic pressure.\par  3. Mildly increased LV wall thickness.\par  4. Spectral Doppler shows restrictive pattern of left ventricular myocardial filling (Grade III diastolic dysfunction).\par  5. Mildly enlarged right ventricle.\par  6. Severely reduced RV systolic function.\par  7. Moderately enlarged left atrium.\par  8. Moderately enlarged right atrium.\par  9. Mild mitral valve regurgitation.\par 10. Mild tricuspid regurgitation.\par 11. Mild aortic regurgitation.\par 12. Estimated pulmonary artery systolic pressure is 46.6 mmHg assuming a \par right atrial pressure of 3 mmHg, which is consistent with mild pulmonary \par hypertension.\par  [de-identified] : Cardiac MRI\par \par IMPRESSION:\par \par 1. Concentric left ventricular hypertrophy, severe global hypokinesis \par with additional findings of mid myocardial lategadolinium enhancement \par within the lateral wall and mid inferoseptum. Findings may be related to \par concentric hypertrophic cardiomyopathy versus infiltrative disease \par (sarcoid). PET/CT may be obtained for further evaluation.\par \par 2. Mild right ventricular hypertrophy with severely depressed function.\par  [de-identified] : [facility] 6/17/2\par \par Showed no evidence of CAD

## 2023-03-08 NOTE — HISTORY OF PRESENT ILLNESS
[FreeTextEntry1] : The patient is a 32 y/o male with a PMHx of NICM HFrEF LVEF 45% previously was 20%, HTN, and obesity. He is here for a follow-up. \par \par Mr. Castillo reports he is doing well, he does not have any limitations on his ET, he walks 1.5 miles a day and rides a bike for 35 mins every other day. His SBP remains at 110s, HR high 50s to 60s, and his weight fluctuate from 261 to 264 lbs. The patient denies CP, bleeding, SOB at rest, PND, abdominal discomfort, LH/dizziness, BLE edema, palpitations, and syncope.

## 2023-03-23 ENCOUNTER — NON-APPOINTMENT (OUTPATIENT)
Age: 33
End: 2023-03-23

## 2023-03-23 ENCOUNTER — APPOINTMENT (OUTPATIENT)
Dept: CARDIOLOGY | Facility: CLINIC | Age: 33
End: 2023-03-23
Payer: COMMERCIAL

## 2023-03-23 PROCEDURE — 99441: CPT

## 2023-04-11 ENCOUNTER — APPOINTMENT (OUTPATIENT)
Dept: NUTRITION | Facility: CLINIC | Age: 33
End: 2023-04-11

## 2023-04-11 ENCOUNTER — OUTPATIENT (OUTPATIENT)
Dept: OUTPATIENT SERVICES | Facility: HOSPITAL | Age: 33
LOS: 1 days | End: 2023-04-11
Payer: COMMERCIAL

## 2023-04-11 DIAGNOSIS — Z00.00 ENCOUNTER FOR GENERAL ADULT MEDICAL EXAMINATION WITHOUT ABNORMAL FINDINGS: ICD-10-CM

## 2023-04-11 DIAGNOSIS — Z71.3 DIETARY COUNSELING AND SURVEILLANCE: ICD-10-CM

## 2023-04-11 DIAGNOSIS — Z78.9 OTHER SPECIFIED HEALTH STATUS: Chronic | ICD-10-CM

## 2023-04-11 PROCEDURE — 97803 MED NUTRITION INDIV SUBSEQ: CPT

## 2023-06-15 ENCOUNTER — APPOINTMENT (OUTPATIENT)
Dept: CARDIOLOGY | Facility: CLINIC | Age: 33
End: 2023-06-15
Payer: COMMERCIAL

## 2023-06-15 VITALS
HEIGHT: 65 IN | DIASTOLIC BLOOD PRESSURE: 80 MMHG | SYSTOLIC BLOOD PRESSURE: 118 MMHG | BODY MASS INDEX: 43.99 KG/M2 | WEIGHT: 264 LBS | HEART RATE: 60 BPM

## 2023-06-15 PROCEDURE — 99214 OFFICE O/P EST MOD 30 MIN: CPT

## 2023-06-15 RX ORDER — TORSEMIDE 10 MG/1
10 TABLET ORAL
Qty: 90 | Refills: 3 | Status: ACTIVE | COMMUNITY
Start: 1900-01-01 | End: 1900-01-01

## 2023-06-15 NOTE — ASSESSMENT
----- Message from Judi Coburn MD sent at 6/25/2019  7:04 AM CDT -----  Significant anemia and iron deficiency persists. Need t ostress importance of daily iron supplementation and repeat CBC/iron studies in 2 months.     If anemia and abnormalities in WBC and platelet count persist, will need hematology evaluation.   [FreeTextEntry1] : Assessment:\par #Non-ischemic cardiomyopathy, with recovered EF (LVEF 54% 9/2022)\par - Presented in HF 6/2022 with LVEF <20 % \par - Euvolemic, well compensated\par - Compliant with medications\par - Cardiac MRI with concern for sarcoidosis, PET scan negative for sarcoid\par #BMI 44\par #Snoring\par \par Plan:\par - Cont Entresto  mg BID, Farxiga 10 mg daily, Spironolactone 25 mg daily, Carvedilol 25 mg BID\par - Labs\par - TTE scheduled for 3/2024\par - F/u with HF team on 3/2024\par - Return to clinic in 6 months \par

## 2023-06-15 NOTE — REVIEW OF SYSTEMS
[Feeling Fatigued] : not feeling fatigued [SOB] : no shortness of breath [Dyspnea on exertion] : not dyspnea during exertion [Chest Discomfort] : no chest discomfort [Lower Ext Edema] : no extremity edema [Leg Claudication] : no intermittent leg claudication [Palpitations] : no palpitations [Orthopnea] : no orthopnea [PND] : no PND [Syncope] : no syncope [Cough] : no cough [Abdominal Pain] : no abdominal pain [Joint Pain] : no joint pain [Rash] : no rash [Dizziness] : no dizziness

## 2023-06-15 NOTE — HISTORY OF PRESENT ILLNESS
[FreeTextEntry1] : Pt is 33 Year old male with non-ischemic CMP and HFrEF (LVEF <20%) 6/2022, now with recovering EF here for follow-up. \par \par 6/15/23\par Feels well. No chest pain, shortness of breath, palpitations, lightheadedness/dizziness, pre-syncope/syncope, or lower extremity edema. Compliant with medications. \par \par Changed diet. Exercising (walking, stationary bike) daily. \par Seeing fertility specialist. \par \par 12/15/22 \par Pt reports that he is feeling better, denies SOB, no chest pain.  Pt hasn’t been taking Torsemide for the past 3 weeks.  S/p EP f.u Pt decline long- term rhythm monitoring.  s/p Covid in october \par \par TTE 09/23/22 TTE EF 54%.  Life Vest was sent back. \par Pt exercises on regular basis without cardiac issues \par 09/1/22 BNP 30 BUN 9/ Cr 0.9 \par \par 7/7/22\par S/p hospitalization at [facility] with severe palpitations, tachycardia,  severe fatigue,  rapid breathing. was d/c home with a life vest no shocks.  .  He had Covid a few days after discharge, reports mild symptoms was on Paxlovid.  As per pt he was ordered PET scan during hospital dc. to r/o Sarcodosis.  Pt denies any chest pain, no palpitations, no dizziness, improved endurance, walking QD 0.25 miles without cardiac symptoms. Compliant with medications. \par \par Pt is back at work. \par \par TTE 6/17/22 EF < 20 %,  elevated LV , diastolic pressure,  Grade III diastolic dysfunction, Severely reduced RV systolic function, Mild MVR, pulm artery systolic press 46.6, mild Pulm HTN. \par \par BNP was 1827.  \par \par Cardiac Cath on 6/17/22 normal coronaries

## 2023-06-15 NOTE — CARDIOLOGY SUMMARY
[de-identified] : EKG 9/28/22 Normal sinus rhythm 74 bpm, LAFB\par EKG 3/3/23 Normal sinus rhythm LAFB 64 bpm  [de-identified] : TTE 6/17/22 EF < 20 %,  elevated LV , diastolic pressure,  Grade III diastolic dysfunction, Severely reduced RV systolic function, Mild MVR, pulm artery systolic press 46.6, mild Pulm HTN\par

## 2023-06-15 NOTE — PHYSICAL EXAM
[Well Developed] : well developed [Well Nourished] : well nourished [No Acute Distress] : no acute distress [Normal Conjunctiva] : normal conjunctiva [No Carotid Bruit] : no carotid bruit [Normal S1, S2] : normal S1, S2 [No Murmur] : no murmur [No Rub] : no rub [No Gallop] : no gallop [Clear Lung Fields] : clear lung fields [Good Air Entry] : good air entry [No Respiratory Distress] : no respiratory distress  [Soft] : abdomen soft [Non Tender] : non-tender [No Masses/organomegaly] : no masses/organomegaly [Normal Bowel Sounds] : normal bowel sounds [Moves all extremities] : moves all extremities [No Focal Deficits] : no focal deficits [Normal Speech] : normal speech [No edema] : no edema [No varicosities] : no varicosities [No chronic venous stasis changes] : no chronic venous stasis changes [No rashes] : no rashes [Present] : present on the right

## 2023-06-20 RX ORDER — SACUBITRIL AND VALSARTAN 97; 103 MG/1; MG/1
97-103 TABLET, FILM COATED ORAL TWICE DAILY
Qty: 180 | Refills: 3 | Status: ACTIVE | COMMUNITY
Start: 1900-01-01 | End: 1900-01-01

## 2023-07-17 ENCOUNTER — APPOINTMENT (OUTPATIENT)
Dept: NUTRITION | Facility: CLINIC | Age: 33
End: 2023-07-17

## 2023-07-17 ENCOUNTER — OUTPATIENT (OUTPATIENT)
Dept: OUTPATIENT SERVICES | Facility: HOSPITAL | Age: 33
LOS: 1 days | End: 2023-07-17
Payer: COMMERCIAL

## 2023-07-17 DIAGNOSIS — Z00.00 ENCOUNTER FOR GENERAL ADULT MEDICAL EXAMINATION WITHOUT ABNORMAL FINDINGS: ICD-10-CM

## 2023-07-17 DIAGNOSIS — Z78.9 OTHER SPECIFIED HEALTH STATUS: Chronic | ICD-10-CM

## 2023-07-17 PROCEDURE — 97803 MED NUTRITION INDIV SUBSEQ: CPT

## 2023-07-18 DIAGNOSIS — I50.9 HEART FAILURE, UNSPECIFIED: ICD-10-CM

## 2023-08-22 ENCOUNTER — RX RENEWAL (OUTPATIENT)
Age: 33
End: 2023-08-22

## 2023-08-22 RX ORDER — CARVEDILOL 25 MG/1
25 TABLET, FILM COATED ORAL TWICE DAILY
Qty: 180 | Refills: 3 | Status: ACTIVE | COMMUNITY
Start: 1900-01-01 | End: 1900-01-01

## 2023-10-23 ENCOUNTER — APPOINTMENT (OUTPATIENT)
Dept: NUTRITION | Facility: CLINIC | Age: 33
End: 2023-10-23

## 2023-10-23 ENCOUNTER — OUTPATIENT (OUTPATIENT)
Dept: OUTPATIENT SERVICES | Facility: HOSPITAL | Age: 33
LOS: 1 days | End: 2023-10-23
Payer: COMMERCIAL

## 2023-10-23 DIAGNOSIS — I50.9 HEART FAILURE, UNSPECIFIED: ICD-10-CM

## 2023-10-23 DIAGNOSIS — Z78.9 OTHER SPECIFIED HEALTH STATUS: Chronic | ICD-10-CM

## 2023-10-23 PROCEDURE — 97803 MED NUTRITION INDIV SUBSEQ: CPT

## 2023-10-24 DIAGNOSIS — I50.9 HEART FAILURE, UNSPECIFIED: ICD-10-CM

## 2023-12-14 ENCOUNTER — APPOINTMENT (OUTPATIENT)
Dept: CARDIOLOGY | Facility: CLINIC | Age: 33
End: 2023-12-14
Payer: COMMERCIAL

## 2023-12-14 VITALS
OXYGEN SATURATION: 97 % | HEART RATE: 55 BPM | DIASTOLIC BLOOD PRESSURE: 70 MMHG | HEIGHT: 65 IN | WEIGHT: 267 LBS | SYSTOLIC BLOOD PRESSURE: 108 MMHG | BODY MASS INDEX: 44.48 KG/M2

## 2023-12-14 DIAGNOSIS — E78.5 HYPERLIPIDEMIA, UNSPECIFIED: ICD-10-CM

## 2023-12-14 DIAGNOSIS — Z71.3 DIETARY COUNSELING AND SURVEILLANCE: ICD-10-CM

## 2023-12-14 PROCEDURE — 93000 ELECTROCARDIOGRAM COMPLETE: CPT

## 2023-12-14 PROCEDURE — 99214 OFFICE O/P EST MOD 30 MIN: CPT | Mod: 25

## 2023-12-14 NOTE — CARDIOLOGY SUMMARY
[de-identified] : EKG 9/28/22 Normal sinus rhythm 74 bpm, LAFB EKG 3/3/23 Normal sinus rhythm LAFB 64 bpm  EKG 12/14/23 Sinus Bradycardia 55 bpm LAD [de-identified] : TTE 6/17/22 EF < 20 %,  elevated LV , diastolic pressure,  Grade III diastolic dysfunction, Severely reduced RV systolic function, Mild MVR, pulm artery systolic press 46.6, mild Pulm HTN\par

## 2023-12-14 NOTE — ASSESSMENT
[FreeTextEntry1] : Assessment: #Non-ischemic cardiomyopathy, with recovered EF (LVEF 54% 9/2022) - Presented in HF 6/2022 with LVEF <20 % - Euvolemic, well compensated - Compliant with medications - Cardiac MRI with concern for sarcoidosis, PET scan negative for sarcoid #BMI 44 #Snoring  6/19/23 , , HDL 29,  lp(a) 14 nmol/L Cr 0.79, K 4.4 A1c 5.1% Mg 2 NT proBNP 45 Hgb 16.9  Plan: - Cont Entresto  mg BID, Farxiga 10 mg daily, Spironolactone 25 mg daily, Carvedilol 25 mg BID - Labs 3/2024 - TTE scheduled for 3/2024 - F/u with HF team on 3/2024 - Encouraged plant-based and Mediterranean diets, along with increased fruit, nut, vegetable, legume, and lean vegetable or animal protein (preferably fish) consumption - Engage in at least 150 minutes per week of accumulated moderate-intensity aerobic physical activity or 75 minutes per week of vigorous-intensity aerobic physical activity - Referral to weight lost physician Dr. Kwesi Metcalf - Return to clinic July 2024  I, Dr. Smith, personally performed the evaluation and management (E/M) services for this established patient who presents today with (a) new problem(s)/exacerbation of (an) existing condition(s). That E/M includes conducting the clinically appropriate interval history &/or exam, assessing all new/exacerbated conditions, and establishing a new plan of care. Today, NP Rina Welch was here to observe &/or participate in the visit & follow plan of care established by me.

## 2023-12-14 NOTE — HISTORY OF PRESENT ILLNESS
[FreeTextEntry1] : Pt is 33 Year old male with non-ischemic CMP and HFrEF (LVEF <20%) 6/2022, now with recovering EF here for follow-up.   12/14/23  Pt is seen for f.u.  Denies chest pain, no SOB, no palpitations, no edema.   Pt monitors his Wt daily. Normal weight around 263 lb.  Took torsemide once last month.  TTE is scheduled for 3/8/24.  Pt walks 2 miles per day exercises on a stationary bike TIW without Angina or SOB.    6/15/23 Feels well. No chest pain, shortness of breath, palpitations, lightheadedness/dizziness, pre-syncope/syncope, or lower extremity edema. Compliant with medications.   Changed diet. Exercising (walking, stationary bike) daily.  Seeing fertility specialist.   12/15/22  Pt reports that he is feeling better, denies SOB, no chest pain.  Pt hasn't been taking Torsemide for the past 3 weeks.  S/p EP f.u Pt decline long- term rhythm monitoring.  s/p Covid in october   TTE 09/23/22 TTE EF 54%.  Life Vest was sent back.  Pt exercises on regular basis without cardiac issues  09/1/22 BNP 30 BUN 9/ Cr 0.9   7/7/22 S/p hospitalization at [facility] with severe palpitations, tachycardia,  severe fatigue,  rapid breathing. was d/c home with a life vest no shocks.  .  He had Covid a few days after discharge, reports mild symptoms was on Paxlovid.  As per pt he was ordered PET scan during hospital dc. to r/o Sarcodosis.  Pt denies any chest pain, no palpitations, no dizziness, improved endurance, walking QD 0.25 miles without cardiac symptoms. Compliant with medications.   Pt is back at work.   TTE 6/17/22 EF < 20 %,  elevated LV , diastolic pressure,  Grade III diastolic dysfunction, Severely reduced RV systolic function, Mild MVR, pulm artery systolic press 46.6, mild Pulm HTN.   BNP was 1827.    Cardiac Cath on 6/17/22 normal coronaries

## 2024-01-29 ENCOUNTER — OUTPATIENT (OUTPATIENT)
Dept: OUTPATIENT SERVICES | Facility: HOSPITAL | Age: 34
LOS: 1 days | End: 2024-01-29
Payer: COMMERCIAL

## 2024-01-29 ENCOUNTER — APPOINTMENT (OUTPATIENT)
Dept: NUTRITION | Facility: CLINIC | Age: 34
End: 2024-01-29

## 2024-01-29 DIAGNOSIS — I50.9 HEART FAILURE, UNSPECIFIED: ICD-10-CM

## 2024-01-29 DIAGNOSIS — Z78.9 OTHER SPECIFIED HEALTH STATUS: Chronic | ICD-10-CM

## 2024-01-29 PROCEDURE — 97803 MED NUTRITION INDIV SUBSEQ: CPT

## 2024-01-30 DIAGNOSIS — I50.9 HEART FAILURE, UNSPECIFIED: ICD-10-CM

## 2024-03-08 ENCOUNTER — APPOINTMENT (OUTPATIENT)
Dept: CARDIOLOGY | Facility: CLINIC | Age: 34
End: 2024-03-08
Payer: COMMERCIAL

## 2024-03-08 PROCEDURE — 93306 TTE W/DOPPLER COMPLETE: CPT

## 2024-03-21 ENCOUNTER — APPOINTMENT (OUTPATIENT)
Dept: CARDIOLOGY | Facility: CLINIC | Age: 34
End: 2024-03-21
Payer: COMMERCIAL

## 2024-03-21 VITALS
OXYGEN SATURATION: 93 % | HEART RATE: 68 BPM | DIASTOLIC BLOOD PRESSURE: 68 MMHG | BODY MASS INDEX: 44.15 KG/M2 | SYSTOLIC BLOOD PRESSURE: 98 MMHG | HEIGHT: 65 IN | WEIGHT: 265 LBS

## 2024-03-21 DIAGNOSIS — I42.8 OTHER CARDIOMYOPATHIES: ICD-10-CM

## 2024-03-21 DIAGNOSIS — I10 ESSENTIAL (PRIMARY) HYPERTENSION: ICD-10-CM

## 2024-03-21 DIAGNOSIS — I42.9 CARDIOMYOPATHY, UNSPECIFIED: ICD-10-CM

## 2024-03-21 DIAGNOSIS — Z71.82 EXERCISE COUNSELING: ICD-10-CM

## 2024-03-21 PROCEDURE — 99214 OFFICE O/P EST MOD 30 MIN: CPT

## 2024-03-21 PROCEDURE — G2211 COMPLEX E/M VISIT ADD ON: CPT

## 2024-03-21 PROCEDURE — 93000 ELECTROCARDIOGRAM COMPLETE: CPT

## 2024-03-27 PROBLEM — I42.9 CARDIOMYOPATHY: Status: ACTIVE | Noted: 2022-06-28

## 2024-03-27 PROBLEM — I10 HTN (HYPERTENSION): Status: ACTIVE | Noted: 2022-07-24

## 2024-03-27 PROBLEM — Z71.82 EXERCISE COUNSELING: Status: ACTIVE | Noted: 2022-07-07

## 2024-03-27 PROBLEM — I42.8 NICM (NONISCHEMIC CARDIOMYOPATHY): Status: ACTIVE | Noted: 2022-07-24

## 2024-03-27 NOTE — CARDIOLOGY SUMMARY
[de-identified] : TTE 6/16/22\par  \par  Summary:\par  1. Left ventricular ejection fraction, by visual estimation, is <20%.\par   2. Elevated left ventricular end-diastolic pressure.\par   3. Mildly increased LV wall thickness.\par   4. Spectral Doppler shows restrictive pattern of left ventricular myocardial filling (Grade III diastolic dysfunction).\par   5. Mildly enlarged right ventricle.\par   6. Severely reduced RV systolic function.\par   7. Moderately enlarged left atrium.\par   8. Moderately enlarged right atrium.\par   9. Mild mitral valve regurgitation.\par  10. Mild tricuspid regurgitation.\par  11. Mild aortic regurgitation.\par  12. Estimated pulmonary artery systolic pressure is 46.6 mmHg assuming a \par  right atrial pressure of 3 mmHg, which is consistent with mild pulmonary \par  hypertension.\par   [de-identified] : Cardiac MRI\par  \par  IMPRESSION:\par  \par  1. Concentric left ventricular hypertrophy, severe global hypokinesis \par  with additional findings of mid myocardial lategadolinium enhancement \par  within the lateral wall and mid inferoseptum. Findings may be related to \par  concentric hypertrophic cardiomyopathy versus infiltrative disease \par  (sarcoid). PET/CT may be obtained for further evaluation.\par  \par  2. Mild right ventricular hypertrophy with severely depressed function.\par   [de-identified] : [facility] 6/17/2\par  \par  Showed no evidence of CAD

## 2024-03-27 NOTE — HISTORY OF PRESENT ILLNESS
[FreeTextEntry1] : The patient is a 35 y/o male with a PMHx of NICM HFrEF LVEF 58% on 3/8/2024 from 45% <20%, HTN, and obesity. He is here for a follow-up.   Patient report feeling great. He is very active and can walk 1-2 miles per day. He also bikes and has no limitations with this. No chest pain, LOC, chest pain, N/V reported.

## 2024-03-27 NOTE — ASSESSMENT
[FreeTextEntry1] :  NICM/HFpEF recovered LVEF/Obesity/HTN  Euvolemic on exam  NYHA class I Pro-BNP <5 on March 8th Continue Torsemide 10 mg only as needed for weight gain of 2 lbs in one day Continue Farxiga 10 mg daily Continue Spironolactone 25 mg daily Continue Carvedilol  25 mg bid Continue Entresto 97/103 mg bid Follow up with KATHRYN Munoz and nutritionist RTO in one year   Nadya Esquivel MD, FAC, [facility]  Advanced Heart Failure/ Mechanical Circulatory Support Pulmonary Hypertension and Cardiac Amyloidosis  [facility]

## 2024-06-04 ENCOUNTER — OUTPATIENT (OUTPATIENT)
Dept: OUTPATIENT SERVICES | Facility: HOSPITAL | Age: 34
LOS: 1 days | End: 2024-06-04
Payer: COMMERCIAL

## 2024-06-04 ENCOUNTER — APPOINTMENT (OUTPATIENT)
Dept: BARIATRICS/WEIGHT MGMT | Facility: CLINIC | Age: 34
End: 2024-06-04
Payer: COMMERCIAL

## 2024-06-04 DIAGNOSIS — Z78.9 OTHER SPECIFIED HEALTH STATUS: Chronic | ICD-10-CM

## 2024-06-04 DIAGNOSIS — I10 ESSENTIAL (PRIMARY) HYPERTENSION: ICD-10-CM

## 2024-06-04 PROCEDURE — G0463: CPT

## 2024-06-04 PROCEDURE — 99205 OFFICE O/P NEW HI 60 MIN: CPT | Mod: 95

## 2024-06-07 NOTE — HISTORY OF PRESENT ILLNESS
[FreeTextEntry1] : 33 yo man with obesity, COVID-induced HFrEF, pre-dm and metabolic syndrome presents for initial obesity medicine xiomara.  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 Bailey), sometimes greek yogurt), sometimes gluten free pasta but rare dinner - 4-5x/week baked salmon, seasoned with Mrs. Bailey, brussell sprouts, broccoli)  on weekends might have piece of meat, rare sushi has cut out fast food - occasionally mexican fast food, seafood, shrimp clams out mainly cut out snacking, occasional unsalted pretzels, occasional cookie at night (pillsbury making 1 cookie), granola bars ([employer]) alcohol only on occasion - at most once per month water, some crystal light packets cut out sugar drinks entirely  PHYSICAL ACTIVITY: walks 1.5-2 miles in addition to day to day walking works at standing desk and tries to get up for 3 minutes for at least 1 hour every other day 38 minutes on a stationary bike in addition likes to swim, light hiking but not regularly  SLEEP: sleep quality is good when sleeping (gets 6-7 hours/night - rare to get as much as 8 hours) - physically feels good when he wakes up some achiness   STRESS: some health issues and yo-yoing but has been consistent for a while now  Please refer to intake forms for complete weight and related history.

## 2024-06-07 NOTE — ASSESSMENT
[FreeTextEntry1] : BARIATRIC SURGERY HISTORY: none  OBESITY COMORBIDITIES: dyslipidemia, pre-dm  ANTI-OBESITY MEDICATIONS: none  OBESITY MEDICATION SIDE EFFECTS: n/a  Recommend the following: reviewed metabolic labs whole foods based eating strategy limiting refined carbs and added fats - recommend plant forward approach gradually increase physical activity as tolerated work with NP on intensive lifestyle modification  rtc in 4 weeks.

## 2024-06-07 NOTE — REASON FOR VISIT
Quality 226: Preventive Care And Screening: Tobacco Use: Screening And Cessation Intervention: Patient screened for tobacco use and is an ex/non-smoker Quality 130: Documentation Of Current Medications In The Medical Record: Current Medications Documented [Home] : at home, [unfilled] , at the time of the visit. Quality 431: Preventive Care And Screening: Unhealthy Alcohol Use - Screening: Patient identified as an unhealthy alcohol user when screened for unhealthy alcohol use using a systematic screening method and received brief counseling [Medical Office: ([facility])___] : at the medical office located in  Detail Level: Detailed [Patient] : the patient [Initial Evaluation] : an initial evaluation [FreeTextEntry1] : obesity

## 2024-06-10 ENCOUNTER — RX RENEWAL (OUTPATIENT)
Age: 34
End: 2024-06-10

## 2024-06-10 RX ORDER — DAPAGLIFLOZIN 10 MG/1
10 TABLET, FILM COATED ORAL DAILY
Qty: 90 | Refills: 3 | Status: ACTIVE | COMMUNITY
Start: 1900-01-01 | End: 1900-01-01

## 2024-06-18 DIAGNOSIS — E66.01 MORBID (SEVERE) OBESITY DUE TO EXCESS CALORIES: ICD-10-CM

## 2024-06-18 LAB
ALBUMIN SERPL ELPH-MCNC: 4.5 G/DL
ALP BLD-CCNC: 88 U/L
ALT SERPL-CCNC: 20 U/L
ANION GAP SERPL CALC-SCNC: 13 MMOL/L
AST SERPL-CCNC: 21 U/L
BILIRUB SERPL-MCNC: 0.7 MG/DL
BUN SERPL-MCNC: 14 MG/DL
CALCIUM SERPL-MCNC: 10 MG/DL
CHLORIDE SERPL-SCNC: 102 MMOL/L
CHOLEST SERPL-MCNC: 180 MG/DL
CO2 SERPL-SCNC: 24 MMOL/L
CREAT SERPL-MCNC: 0.9 MG/DL
EGFR: 114 ML/MIN/1.73M2
ESTIMATED AVERAGE GLUCOSE: 117 MG/DL
GLUCOSE SERPL-MCNC: 95 MG/DL
HBA1C MFR BLD HPLC: 5.7 %
HCT VFR BLD CALC: 55.6 %
HDLC SERPL-MCNC: 28 MG/DL
HGB BLD-MCNC: 17.7 G/DL
LDLC SERPL CALC-MCNC: 120 MG/DL
MAGNESIUM SERPL-MCNC: 1.8 MG/DL
MCHC RBC-ENTMCNC: 28 PG
MCHC RBC-ENTMCNC: 31.8 G/DL
MCV RBC AUTO: 88 FL
NONHDLC SERPL-MCNC: 152 MG/DL
NT-PROBNP SERPL-MCNC: <5 PG/ML
PLATELET # BLD AUTO: 262 K/UL
PMV BLD AUTO: 0 /100 WBCS
PMV BLD: 10.5 FL
POTASSIUM SERPL-SCNC: 4.8 MMOL/L
PROT SERPL-MCNC: 7.4 G/DL
RBC # BLD: 6.32 M/UL
RBC # FLD: 14.1 %
SODIUM SERPL-SCNC: 139 MMOL/L
TRIGL SERPL-MCNC: 162 MG/DL
WBC # FLD AUTO: 9.34 K/UL

## 2024-07-02 ENCOUNTER — OUTPATIENT (OUTPATIENT)
Dept: OUTPATIENT SERVICES | Facility: HOSPITAL | Age: 34
LOS: 1 days | End: 2024-07-02

## 2024-07-02 ENCOUNTER — APPOINTMENT (OUTPATIENT)
Dept: BARIATRICS/WEIGHT MGMT | Facility: CLINIC | Age: 34
End: 2024-07-02

## 2024-07-02 VITALS — WEIGHT: 262 LBS | BODY MASS INDEX: 43.65 KG/M2 | HEIGHT: 65 IN

## 2024-07-02 DIAGNOSIS — Z78.9 OTHER SPECIFIED HEALTH STATUS: Chronic | ICD-10-CM

## 2024-07-02 DIAGNOSIS — E66.01 MORBID (SEVERE) OBESITY DUE TO EXCESS CALORIES: ICD-10-CM

## 2024-07-02 DIAGNOSIS — I50.9 HEART FAILURE, UNSPECIFIED: ICD-10-CM

## 2024-07-02 DIAGNOSIS — I50.20 UNSPECIFIED SYSTOLIC (CONGESTIVE) HEART FAILURE: ICD-10-CM

## 2024-07-02 PROCEDURE — 99213 OFFICE O/P EST LOW 20 MIN: CPT | Mod: 95

## 2024-07-03 DIAGNOSIS — I10 ESSENTIAL (PRIMARY) HYPERTENSION: ICD-10-CM

## 2024-07-18 ENCOUNTER — APPOINTMENT (OUTPATIENT)
Dept: CARDIOLOGY | Facility: CLINIC | Age: 34
End: 2024-07-18
Payer: COMMERCIAL

## 2024-07-18 VITALS
SYSTOLIC BLOOD PRESSURE: 110 MMHG | WEIGHT: 261 LBS | HEIGHT: 65 IN | DIASTOLIC BLOOD PRESSURE: 82 MMHG | BODY MASS INDEX: 43.49 KG/M2 | HEART RATE: 55 BPM

## 2024-07-18 DIAGNOSIS — I10 ESSENTIAL (PRIMARY) HYPERTENSION: ICD-10-CM

## 2024-07-18 DIAGNOSIS — Z71.82 EXERCISE COUNSELING: ICD-10-CM

## 2024-07-18 DIAGNOSIS — R06.83 SNORING: ICD-10-CM

## 2024-07-18 DIAGNOSIS — E78.5 HYPERLIPIDEMIA, UNSPECIFIED: ICD-10-CM

## 2024-07-18 DIAGNOSIS — I42.8 OTHER CARDIOMYOPATHIES: ICD-10-CM

## 2024-07-18 PROCEDURE — 99214 OFFICE O/P EST MOD 30 MIN: CPT | Mod: 25

## 2024-07-18 PROCEDURE — 93000 ELECTROCARDIOGRAM COMPLETE: CPT

## 2024-07-22 ENCOUNTER — RX RENEWAL (OUTPATIENT)
Age: 34
End: 2024-07-22

## 2024-08-02 ENCOUNTER — APPOINTMENT (OUTPATIENT)
Dept: BARIATRICS/WEIGHT MGMT | Facility: CLINIC | Age: 34
End: 2024-08-02
Payer: COMMERCIAL

## 2024-08-02 ENCOUNTER — OUTPATIENT (OUTPATIENT)
Dept: OUTPATIENT SERVICES | Facility: HOSPITAL | Age: 34
LOS: 1 days | End: 2024-08-02
Payer: COMMERCIAL

## 2024-08-02 DIAGNOSIS — Z78.9 OTHER SPECIFIED HEALTH STATUS: Chronic | ICD-10-CM

## 2024-08-02 DIAGNOSIS — E66.01 MORBID (SEVERE) OBESITY DUE TO EXCESS CALORIES: ICD-10-CM

## 2024-08-02 DIAGNOSIS — I10 ESSENTIAL (PRIMARY) HYPERTENSION: ICD-10-CM

## 2024-08-02 PROCEDURE — G0463: CPT

## 2024-08-02 PROCEDURE — G2211 COMPLEX E/M VISIT ADD ON: CPT | Mod: NC,95

## 2024-08-02 PROCEDURE — 99213 OFFICE O/P EST LOW 20 MIN: CPT | Mod: 95

## 2024-08-02 NOTE — REASON FOR VISIT
[Home] : at home, [unfilled] , at the time of the visit. [Medical Office: ([facility])___] : at the medical office located in  [Patient] : the patient [Initial Evaluation] : an initial evaluation [FreeTextEntry1] : obesity

## 2024-08-02 NOTE — ASSESSMENT
[FreeTextEntry1] : BARIATRIC SURGERY HISTORY: none  OBESITY COMORBIDITIES: dyslipidemia, pre-dm  ANTI-OBESITY MEDICATIONS: none  OBESITY MEDICATION SIDE EFFECTS: n/a  Recommend the following:  cont to advance towardswhole foods based eating strategy limiting refined carbs and added fats - recommend plant forward approach cont with increased physical activity as tolerated cont to work with NP on intensive lifestyle modification  rtc in 4 weeks.

## 2024-08-02 NOTE — HISTORY OF PRESENT ILLNESS
[FreeTextEntry1] : 33 yo man with obesity, COVID-induced HFrEF, pre-dm and metabolic syndrome presents for initial obesity medicine xiomara.  struggled with weight a lot of his life lightest in college 215-225 lbs (2011) since then has yo-yoed a lot a few years ago reached max 302 lbs got COVID leading to CHF June 2022 in 2 years lost 45-50 lbs initial weight 262.3 height - 5'5 BMI = 44  has regular exercise regimen last EF WNL in 3-2024 (was in teens during COVID)  interval history today's weight. = 257.9 (lowest 256.8 was a low) has made some dietary changes - eating more whole foods and plant based options up to 2 miles on walks now overall reports feeling great  Otherwise without new complaints today.

## 2024-08-04 ENCOUNTER — OUTPATIENT (OUTPATIENT)
Dept: OUTPATIENT SERVICES | Facility: HOSPITAL | Age: 34
LOS: 1 days | Discharge: HOME | End: 2024-08-04
Payer: COMMERCIAL

## 2024-08-04 DIAGNOSIS — Z78.9 OTHER SPECIFIED HEALTH STATUS: Chronic | ICD-10-CM

## 2024-08-04 PROCEDURE — 95800 SLP STDY UNATTENDED: CPT

## 2024-08-04 PROCEDURE — 95800 SLP STDY UNATTENDED: CPT | Mod: 26

## 2024-08-12 DIAGNOSIS — E66.01 MORBID (SEVERE) OBESITY DUE TO EXCESS CALORIES: ICD-10-CM

## 2024-08-13 DIAGNOSIS — G47.33 OBSTRUCTIVE SLEEP APNEA (ADULT) (PEDIATRIC): ICD-10-CM

## 2024-08-14 DIAGNOSIS — G47.33 OBSTRUCTIVE SLEEP APNEA (ADULT) (PEDIATRIC): ICD-10-CM

## 2024-09-04 ENCOUNTER — APPOINTMENT (OUTPATIENT)
Dept: BARIATRICS/WEIGHT MGMT | Facility: CLINIC | Age: 34
End: 2024-09-04
Payer: COMMERCIAL

## 2024-09-04 ENCOUNTER — OUTPATIENT (OUTPATIENT)
Dept: OUTPATIENT SERVICES | Facility: HOSPITAL | Age: 34
LOS: 1 days | End: 2024-09-04
Payer: COMMERCIAL

## 2024-09-04 VITALS — WEIGHT: 261 LBS | HEIGHT: 65 IN | BODY MASS INDEX: 43.49 KG/M2

## 2024-09-04 DIAGNOSIS — E66.01 MORBID (SEVERE) OBESITY DUE TO EXCESS CALORIES: ICD-10-CM

## 2024-09-04 DIAGNOSIS — I10 ESSENTIAL (PRIMARY) HYPERTENSION: ICD-10-CM

## 2024-09-04 DIAGNOSIS — Z78.9 OTHER SPECIFIED HEALTH STATUS: Chronic | ICD-10-CM

## 2024-09-04 DIAGNOSIS — I50.20 UNSPECIFIED SYSTOLIC (CONGESTIVE) HEART FAILURE: ICD-10-CM

## 2024-09-04 PROCEDURE — 99214 OFFICE O/P EST MOD 30 MIN: CPT | Mod: 95

## 2024-09-04 PROCEDURE — G2211 COMPLEX E/M VISIT ADD ON: CPT | Mod: NC,95

## 2024-09-04 PROCEDURE — G0463: CPT

## 2024-09-04 NOTE — ASSESSMENT
[FreeTextEntry1] : BARIATRIC SURGERY HISTORY: none  OBESITY COMORBIDITIES: dyslipidemia, pre-dm  ANTI-OBESITY MEDICATIONS: none  OBESITY MEDICATION SIDE EFFECTS: n/a  Recommend the following: Reviewed whole foods based eating strategy limiting refined carbs and added fats - recommend plant forward approach. encouraged to continue to try new foods, recipes.  Continue regular physical activity (bike, walking, wt's). Set goals and increase time and intensity given exercise tolerance is good at this time.  Hold off on AOMs for time being Optimize sleep/stress management.    rtc in 4 weeks w/Dr Metcalf and 2 months w/me

## 2024-09-04 NOTE — HISTORY OF PRESENT ILLNESS
[FreeTextEntry1] : 35 yo man with obesity, COVID-induced HFrEF, pre-dm and metabolic syndrome presents obesity medicine follow up.   struggled with weight a lot of his life lightest in college 215-225 lbs (2011) since then has yo-yoed a lot a few years ago reached max 302 lbs got COVID leading to CHF June 2022 in 2 years lost 45-50 lbs current weight around 260 lbs height - 5'5 BMI = 44  has regular exercise regimen last EF WNL in 3-2024 (was in teens during COVID)  Interim hx: -Pt wt stable since initial visit. -had started incorporating some dietary changes, last 3 weeks wife was off work and traveled some so wasn't as disciplined with diet.  -exercising regularly, walking 2 miles/day, 17KM on stationary bike every other day. Incorporates some weights into routine.  -revamped his diet 2 years ago after being sick. Describes himself as a picky eater.  -feels confident will be able to get back on track now -has reduced meat in diet. Eats salmon 3-4 times/week. trying to have more veggie centric meals. Enjoys brussel sprouts and broccoli. Eating more salads, greek yogurt.  -tried lentils and beans, not a big fan -trying some of the recipes I sent, making slow changes.   Pt motivated to lose weight and improve his health.

## 2024-09-09 DIAGNOSIS — I50.20 UNSPECIFIED SYSTOLIC (CONGESTIVE) HEART FAILURE: ICD-10-CM

## 2024-09-09 DIAGNOSIS — E66.01 MORBID (SEVERE) OBESITY DUE TO EXCESS CALORIES: ICD-10-CM

## 2024-09-25 ENCOUNTER — APPOINTMENT (OUTPATIENT)
Dept: PULMONOLOGY | Facility: CLINIC | Age: 34
End: 2024-09-25
Payer: COMMERCIAL

## 2024-09-25 ENCOUNTER — APPOINTMENT (OUTPATIENT)
Dept: NUTRITION | Facility: CLINIC | Age: 34
End: 2024-09-25

## 2024-09-25 ENCOUNTER — OUTPATIENT (OUTPATIENT)
Dept: OUTPATIENT SERVICES | Facility: HOSPITAL | Age: 34
LOS: 1 days | End: 2024-09-25
Payer: COMMERCIAL

## 2024-09-25 DIAGNOSIS — G47.10 HYPERSOMNIA, UNSPECIFIED: ICD-10-CM

## 2024-09-25 DIAGNOSIS — I42.9 CARDIOMYOPATHY, UNSPECIFIED: ICD-10-CM

## 2024-09-25 DIAGNOSIS — G47.30 HYPERSOMNIA, UNSPECIFIED: ICD-10-CM

## 2024-09-25 DIAGNOSIS — Z78.9 OTHER SPECIFIED HEALTH STATUS: Chronic | ICD-10-CM

## 2024-09-25 DIAGNOSIS — I50.9 HEART FAILURE, UNSPECIFIED: ICD-10-CM

## 2024-09-25 DIAGNOSIS — G47.33 OBSTRUCTIVE SLEEP APNEA (ADULT) (PEDIATRIC): ICD-10-CM

## 2024-09-25 DIAGNOSIS — E66.9 OBESITY, UNSPECIFIED: ICD-10-CM

## 2024-09-25 PROCEDURE — 99213 OFFICE O/P EST LOW 20 MIN: CPT

## 2024-09-25 PROCEDURE — 97803 MED NUTRITION INDIV SUBSEQ: CPT

## 2024-09-25 NOTE — HISTORY OF PRESENT ILLNESS
[TextBox_4] : The patient presents for the evaluation of loud snoring and restlessness at night. The patient is restless during sleep and has frequent nocturia. The bed partner confirms the restlessness. During the day there are episodes on increased sleepiness. Patient was referred after seeing his cardiologist where he was discovered to have a cardiomyopathy and congestive heart failure.  He is known to have a reduced ejection fraction.

## 2024-09-25 NOTE — REASON FOR VISIT
[Home] : at home, [unfilled] , at the time of the visit. [Medical Office: ([facility])___] : at the medical office located in  [Patient] : the patient [Self] : self [Initial] : an initial visit [Sleep Evaluation] : sleep evaluation

## 2024-09-25 NOTE — ASSESSMENT
[FreeTextEntry1] : Assessment: EDDIE Obesity EDS    PLAN: The patient is needs  the PAP device.  I will make arrangements for delivery of an auto titrating CPAP device I had a long discussion with the patient regarding its use The patient's wife has sleep apnea and is using a CPAP unit.  He is very well aware of the requirements. New supplies will be ordered when required. Weight loss maintenance discussed. I stressed the need maintain compliance with the PAP device. The patient is not to use an Ozone or UV sterilizer. F/U in 3 months after receiving the CPAP unit The patient is going to follow-up with his cardiologist regarding ongoing cardiac care.

## 2024-09-26 DIAGNOSIS — I50.9 HEART FAILURE, UNSPECIFIED: ICD-10-CM

## 2024-10-09 ENCOUNTER — OUTPATIENT (OUTPATIENT)
Dept: OUTPATIENT SERVICES | Facility: HOSPITAL | Age: 34
LOS: 1 days | End: 2024-10-09
Payer: COMMERCIAL

## 2024-10-09 ENCOUNTER — APPOINTMENT (OUTPATIENT)
Dept: BARIATRICS/WEIGHT MGMT | Facility: CLINIC | Age: 34
End: 2024-10-09
Payer: COMMERCIAL

## 2024-10-09 DIAGNOSIS — E66.9 OBESITY, UNSPECIFIED: ICD-10-CM

## 2024-10-09 DIAGNOSIS — Z78.9 OTHER SPECIFIED HEALTH STATUS: Chronic | ICD-10-CM

## 2024-10-09 PROCEDURE — G2211 COMPLEX E/M VISIT ADD ON: CPT | Mod: 95

## 2024-10-09 PROCEDURE — G0463: CPT

## 2024-10-09 PROCEDURE — 99213 OFFICE O/P EST LOW 20 MIN: CPT | Mod: 95

## 2024-10-10 DIAGNOSIS — I10 ESSENTIAL (PRIMARY) HYPERTENSION: ICD-10-CM

## 2024-10-21 DIAGNOSIS — E66.9 OBESITY, UNSPECIFIED: ICD-10-CM

## 2024-11-08 ENCOUNTER — OUTPATIENT (OUTPATIENT)
Dept: OUTPATIENT SERVICES | Facility: HOSPITAL | Age: 34
LOS: 1 days | End: 2024-11-08
Payer: COMMERCIAL

## 2024-11-08 ENCOUNTER — APPOINTMENT (OUTPATIENT)
Dept: BARIATRICS/WEIGHT MGMT | Facility: CLINIC | Age: 34
End: 2024-11-08
Payer: COMMERCIAL

## 2024-11-08 DIAGNOSIS — E66.9 OBESITY, UNSPECIFIED: ICD-10-CM

## 2024-11-08 DIAGNOSIS — I50.9 HEART FAILURE, UNSPECIFIED: ICD-10-CM

## 2024-11-08 DIAGNOSIS — I10 ESSENTIAL (PRIMARY) HYPERTENSION: ICD-10-CM

## 2024-11-08 DIAGNOSIS — Z78.9 OTHER SPECIFIED HEALTH STATUS: Chronic | ICD-10-CM

## 2024-11-08 PROCEDURE — G2211 COMPLEX E/M VISIT ADD ON: CPT | Mod: NC,95

## 2024-11-08 PROCEDURE — G0463: CPT

## 2024-11-08 PROCEDURE — 99214 OFFICE O/P EST MOD 30 MIN: CPT | Mod: 95

## 2024-11-10 VITALS — BODY MASS INDEX: 42.99 KG/M2 | HEIGHT: 65 IN | WEIGHT: 258 LBS

## 2024-12-05 ENCOUNTER — APPOINTMENT (OUTPATIENT)
Dept: CARDIOLOGY | Facility: CLINIC | Age: 34
End: 2024-12-05
Payer: COMMERCIAL

## 2024-12-05 ENCOUNTER — NON-APPOINTMENT (OUTPATIENT)
Age: 34
End: 2024-12-05

## 2024-12-05 VITALS
WEIGHT: 268 LBS | HEART RATE: 69 BPM | SYSTOLIC BLOOD PRESSURE: 110 MMHG | HEIGHT: 65 IN | BODY MASS INDEX: 44.65 KG/M2 | DIASTOLIC BLOOD PRESSURE: 86 MMHG

## 2024-12-05 DIAGNOSIS — G47.33 OBSTRUCTIVE SLEEP APNEA (ADULT) (PEDIATRIC): ICD-10-CM

## 2024-12-05 DIAGNOSIS — I50.20 UNSPECIFIED SYSTOLIC (CONGESTIVE) HEART FAILURE: ICD-10-CM

## 2024-12-05 DIAGNOSIS — I42.8 OTHER CARDIOMYOPATHIES: ICD-10-CM

## 2024-12-05 PROCEDURE — 99214 OFFICE O/P EST MOD 30 MIN: CPT

## 2024-12-05 PROCEDURE — G2211 COMPLEX E/M VISIT ADD ON: CPT | Mod: NC

## 2024-12-05 PROCEDURE — 93000 ELECTROCARDIOGRAM COMPLETE: CPT

## 2024-12-06 ENCOUNTER — OUTPATIENT (OUTPATIENT)
Dept: OUTPATIENT SERVICES | Facility: HOSPITAL | Age: 34
LOS: 1 days | End: 2024-12-06
Payer: COMMERCIAL

## 2024-12-06 ENCOUNTER — APPOINTMENT (OUTPATIENT)
Dept: BARIATRICS/WEIGHT MGMT | Facility: CLINIC | Age: 34
End: 2024-12-06
Payer: COMMERCIAL

## 2024-12-06 DIAGNOSIS — I10 ESSENTIAL (PRIMARY) HYPERTENSION: ICD-10-CM

## 2024-12-06 DIAGNOSIS — E66.9 OBESITY, UNSPECIFIED: ICD-10-CM

## 2024-12-06 DIAGNOSIS — Z78.9 OTHER SPECIFIED HEALTH STATUS: Chronic | ICD-10-CM

## 2024-12-06 PROCEDURE — 99213 OFFICE O/P EST LOW 20 MIN: CPT | Mod: 95

## 2024-12-06 PROCEDURE — G0463: CPT

## 2024-12-16 DIAGNOSIS — E66.9 OBESITY, UNSPECIFIED: ICD-10-CM

## 2025-01-03 ENCOUNTER — APPOINTMENT (OUTPATIENT)
Dept: BARIATRICS/WEIGHT MGMT | Facility: CLINIC | Age: 35
End: 2025-01-03
Payer: COMMERCIAL

## 2025-01-03 ENCOUNTER — OUTPATIENT (OUTPATIENT)
Dept: OUTPATIENT SERVICES | Facility: HOSPITAL | Age: 35
LOS: 1 days | End: 2025-01-03
Payer: COMMERCIAL

## 2025-01-03 DIAGNOSIS — I10 ESSENTIAL (PRIMARY) HYPERTENSION: ICD-10-CM

## 2025-01-03 DIAGNOSIS — Z78.9 OTHER SPECIFIED HEALTH STATUS: Chronic | ICD-10-CM

## 2025-01-03 DIAGNOSIS — I50.9 HEART FAILURE, UNSPECIFIED: ICD-10-CM

## 2025-01-03 DIAGNOSIS — E66.9 OBESITY, UNSPECIFIED: ICD-10-CM

## 2025-01-03 PROCEDURE — 99214 OFFICE O/P EST MOD 30 MIN: CPT | Mod: 95

## 2025-01-03 PROCEDURE — G0463: CPT

## 2025-01-13 DIAGNOSIS — E66.9 OBESITY, UNSPECIFIED: ICD-10-CM

## 2025-01-13 DIAGNOSIS — I50.9 HEART FAILURE, UNSPECIFIED: ICD-10-CM

## 2025-01-31 ENCOUNTER — APPOINTMENT (OUTPATIENT)
Dept: BARIATRICS/WEIGHT MGMT | Facility: CLINIC | Age: 35
End: 2025-01-31
Payer: COMMERCIAL

## 2025-01-31 ENCOUNTER — OUTPATIENT (OUTPATIENT)
Dept: OUTPATIENT SERVICES | Facility: HOSPITAL | Age: 35
LOS: 1 days | End: 2025-01-31

## 2025-01-31 DIAGNOSIS — I10 ESSENTIAL (PRIMARY) HYPERTENSION: ICD-10-CM

## 2025-01-31 DIAGNOSIS — G47.33 OBSTRUCTIVE SLEEP APNEA (ADULT) (PEDIATRIC): ICD-10-CM

## 2025-01-31 DIAGNOSIS — E66.9 OBESITY, UNSPECIFIED: ICD-10-CM

## 2025-01-31 DIAGNOSIS — Z78.9 OTHER SPECIFIED HEALTH STATUS: Chronic | ICD-10-CM

## 2025-01-31 PROCEDURE — 99213 OFFICE O/P EST LOW 20 MIN: CPT | Mod: 95

## 2025-03-07 ENCOUNTER — APPOINTMENT (OUTPATIENT)
Dept: BARIATRICS/WEIGHT MGMT | Facility: CLINIC | Age: 35
End: 2025-03-07

## 2025-03-15 ENCOUNTER — NON-APPOINTMENT (OUTPATIENT)
Age: 35
End: 2025-03-15

## 2025-03-21 ENCOUNTER — APPOINTMENT (OUTPATIENT)
Dept: CARDIOLOGY | Facility: CLINIC | Age: 35
End: 2025-03-21
Payer: COMMERCIAL

## 2025-03-21 DIAGNOSIS — I42.9 CARDIOMYOPATHY, UNSPECIFIED: ICD-10-CM

## 2025-03-21 PROCEDURE — 93306 TTE W/DOPPLER COMPLETE: CPT

## 2025-04-04 ENCOUNTER — NON-APPOINTMENT (OUTPATIENT)
Age: 35
End: 2025-04-04

## 2025-04-04 ENCOUNTER — APPOINTMENT (OUTPATIENT)
Dept: HEART FAILURE | Facility: CLINIC | Age: 35
End: 2025-04-04
Payer: COMMERCIAL

## 2025-04-04 VITALS
HEART RATE: 63 BPM | WEIGHT: 277 LBS | DIASTOLIC BLOOD PRESSURE: 80 MMHG | SYSTOLIC BLOOD PRESSURE: 114 MMHG | OXYGEN SATURATION: 90 % | BODY MASS INDEX: 46.15 KG/M2 | HEIGHT: 65 IN

## 2025-04-04 PROCEDURE — G2211 COMPLEX E/M VISIT ADD ON: CPT | Mod: NC

## 2025-04-04 PROCEDURE — 93000 ELECTROCARDIOGRAM COMPLETE: CPT

## 2025-04-04 PROCEDURE — 99215 OFFICE O/P EST HI 40 MIN: CPT

## 2025-04-11 ENCOUNTER — OUTPATIENT (OUTPATIENT)
Dept: OUTPATIENT SERVICES | Facility: HOSPITAL | Age: 35
LOS: 1 days | End: 2025-04-11

## 2025-04-11 ENCOUNTER — APPOINTMENT (OUTPATIENT)
Dept: BARIATRICS/WEIGHT MGMT | Facility: CLINIC | Age: 35
End: 2025-04-11
Payer: COMMERCIAL

## 2025-04-11 DIAGNOSIS — G47.33 OBSTRUCTIVE SLEEP APNEA (ADULT) (PEDIATRIC): ICD-10-CM

## 2025-04-11 DIAGNOSIS — E66.9 OBESITY, UNSPECIFIED: ICD-10-CM

## 2025-04-11 DIAGNOSIS — Z78.9 OTHER SPECIFIED HEALTH STATUS: Chronic | ICD-10-CM

## 2025-04-11 PROCEDURE — 99213 OFFICE O/P EST LOW 20 MIN: CPT | Mod: 95

## 2025-04-14 DIAGNOSIS — I10 ESSENTIAL (PRIMARY) HYPERTENSION: ICD-10-CM

## 2025-06-04 ENCOUNTER — RX RENEWAL (OUTPATIENT)
Age: 35
End: 2025-06-04

## 2025-06-06 ENCOUNTER — OUTPATIENT (OUTPATIENT)
Dept: OUTPATIENT SERVICES | Facility: HOSPITAL | Age: 35
LOS: 1 days | End: 2025-06-06

## 2025-06-06 ENCOUNTER — APPOINTMENT (OUTPATIENT)
Dept: BARIATRICS/WEIGHT MGMT | Facility: CLINIC | Age: 35
End: 2025-06-06
Payer: COMMERCIAL

## 2025-06-06 ENCOUNTER — APPOINTMENT (OUTPATIENT)
Dept: CARDIOLOGY | Facility: CLINIC | Age: 35
End: 2025-06-06
Payer: COMMERCIAL

## 2025-06-06 VITALS
DIASTOLIC BLOOD PRESSURE: 84 MMHG | WEIGHT: 283 LBS | SYSTOLIC BLOOD PRESSURE: 110 MMHG | HEIGHT: 65 IN | HEART RATE: 66 BPM | BODY MASS INDEX: 47.15 KG/M2

## 2025-06-06 DIAGNOSIS — Z78.9 OTHER SPECIFIED HEALTH STATUS: Chronic | ICD-10-CM

## 2025-06-06 PROCEDURE — G2211 COMPLEX E/M VISIT ADD ON: CPT | Mod: NC,95

## 2025-06-06 PROCEDURE — 99214 OFFICE O/P EST MOD 30 MIN: CPT

## 2025-06-06 PROCEDURE — G2211 COMPLEX E/M VISIT ADD ON: CPT | Mod: NC

## 2025-06-06 PROCEDURE — 93000 ELECTROCARDIOGRAM COMPLETE: CPT

## 2025-06-06 PROCEDURE — 99213 OFFICE O/P EST LOW 20 MIN: CPT | Mod: 95

## 2025-06-09 DIAGNOSIS — I10 ESSENTIAL (PRIMARY) HYPERTENSION: ICD-10-CM

## 2025-07-18 ENCOUNTER — APPOINTMENT (OUTPATIENT)
Dept: BARIATRICS/WEIGHT MGMT | Facility: CLINIC | Age: 35
End: 2025-07-18
Payer: COMMERCIAL

## 2025-07-18 ENCOUNTER — OUTPATIENT (OUTPATIENT)
Dept: OUTPATIENT SERVICES | Facility: HOSPITAL | Age: 35
LOS: 1 days | End: 2025-07-18

## 2025-07-18 DIAGNOSIS — I10 ESSENTIAL (PRIMARY) HYPERTENSION: ICD-10-CM

## 2025-07-18 DIAGNOSIS — Z78.9 OTHER SPECIFIED HEALTH STATUS: Chronic | ICD-10-CM

## 2025-07-18 PROCEDURE — 99213 OFFICE O/P EST LOW 20 MIN: CPT | Mod: 95

## 2025-07-18 PROCEDURE — G2211 COMPLEX E/M VISIT ADD ON: CPT | Mod: NC,95
